# Patient Record
Sex: FEMALE | Race: WHITE
[De-identification: names, ages, dates, MRNs, and addresses within clinical notes are randomized per-mention and may not be internally consistent; named-entity substitution may affect disease eponyms.]

---

## 2018-06-19 ENCOUNTER — HOSPITAL ENCOUNTER (OUTPATIENT)
Dept: HOSPITAL 61 - KCIC MRI | Age: 68
Discharge: HOME | End: 2018-06-19
Attending: PHYSICAL MEDICINE & REHABILITATION
Payer: COMMERCIAL

## 2018-06-19 DIAGNOSIS — M51.27: ICD-10-CM

## 2018-06-19 DIAGNOSIS — M48.061: Primary | ICD-10-CM

## 2018-06-19 PROCEDURE — 72148 MRI LUMBAR SPINE W/O DYE: CPT

## 2020-08-12 ENCOUNTER — HOSPITAL ENCOUNTER (INPATIENT)
Dept: HOSPITAL 61 - ER | Age: 70
LOS: 8 days | Discharge: NURSING, REHAB OR LONG-TERM CARE | DRG: 280 | End: 2020-08-20
Attending: FAMILY MEDICINE | Admitting: FAMILY MEDICINE
Payer: MEDICARE

## 2020-08-12 VITALS — WEIGHT: 203.05 LBS | HEIGHT: 64 IN | BODY MASS INDEX: 34.66 KG/M2

## 2020-08-12 VITALS — SYSTOLIC BLOOD PRESSURE: 145 MMHG | DIASTOLIC BLOOD PRESSURE: 82 MMHG

## 2020-08-12 VITALS — SYSTOLIC BLOOD PRESSURE: 134 MMHG | DIASTOLIC BLOOD PRESSURE: 89 MMHG

## 2020-08-12 VITALS — DIASTOLIC BLOOD PRESSURE: 89 MMHG | SYSTOLIC BLOOD PRESSURE: 114 MMHG

## 2020-08-12 VITALS — SYSTOLIC BLOOD PRESSURE: 166 MMHG | DIASTOLIC BLOOD PRESSURE: 98 MMHG

## 2020-08-12 VITALS — SYSTOLIC BLOOD PRESSURE: 140 MMHG | DIASTOLIC BLOOD PRESSURE: 89 MMHG

## 2020-08-12 VITALS — SYSTOLIC BLOOD PRESSURE: 145 MMHG | DIASTOLIC BLOOD PRESSURE: 108 MMHG

## 2020-08-12 VITALS — DIASTOLIC BLOOD PRESSURE: 77 MMHG | SYSTOLIC BLOOD PRESSURE: 141 MMHG

## 2020-08-12 DIAGNOSIS — G89.29: ICD-10-CM

## 2020-08-12 DIAGNOSIS — D50.9: ICD-10-CM

## 2020-08-12 DIAGNOSIS — I26.92: ICD-10-CM

## 2020-08-12 DIAGNOSIS — I27.20: ICD-10-CM

## 2020-08-12 DIAGNOSIS — E03.9: ICD-10-CM

## 2020-08-12 DIAGNOSIS — J96.01: ICD-10-CM

## 2020-08-12 DIAGNOSIS — M54.16: ICD-10-CM

## 2020-08-12 DIAGNOSIS — F17.210: ICD-10-CM

## 2020-08-12 DIAGNOSIS — K76.0: ICD-10-CM

## 2020-08-12 DIAGNOSIS — Z20.828: ICD-10-CM

## 2020-08-12 DIAGNOSIS — E78.5: ICD-10-CM

## 2020-08-12 DIAGNOSIS — E66.01: ICD-10-CM

## 2020-08-12 DIAGNOSIS — Z90.49: ICD-10-CM

## 2020-08-12 DIAGNOSIS — Z90.710: ICD-10-CM

## 2020-08-12 DIAGNOSIS — Z86.011: ICD-10-CM

## 2020-08-12 DIAGNOSIS — I21.4: Primary | ICD-10-CM

## 2020-08-12 DIAGNOSIS — M19.90: ICD-10-CM

## 2020-08-12 DIAGNOSIS — Z96.653: ICD-10-CM

## 2020-08-12 DIAGNOSIS — I07.1: ICD-10-CM

## 2020-08-12 DIAGNOSIS — I25.10: ICD-10-CM

## 2020-08-12 DIAGNOSIS — Z82.49: ICD-10-CM

## 2020-08-12 DIAGNOSIS — I82.402: ICD-10-CM

## 2020-08-12 DIAGNOSIS — Z79.01: ICD-10-CM

## 2020-08-12 DIAGNOSIS — Z88.2: ICD-10-CM

## 2020-08-12 DIAGNOSIS — J45.909: ICD-10-CM

## 2020-08-12 DIAGNOSIS — I48.91: ICD-10-CM

## 2020-08-12 DIAGNOSIS — I10: ICD-10-CM

## 2020-08-12 LAB
ALBUMIN SERPL-MCNC: 3.1 G/DL (ref 3.4–5)
ALBUMIN/GLOB SERPL: 0.7 {RATIO} (ref 1–1.7)
ALP SERPL-CCNC: 123 U/L (ref 46–116)
ALT SERPL-CCNC: 25 U/L (ref 14–59)
ANION GAP SERPL CALC-SCNC: 11 MMOL/L (ref 6–14)
ANISOCYTOSIS BLD QL SMEAR: (no result)
APTT BLD: 34 SEC (ref 24–38)
AST SERPL-CCNC: 26 U/L (ref 15–37)
BASOPHILS # BLD AUTO: 0.1 X10^3/UL (ref 0–0.2)
BASOPHILS NFR BLD: 1 % (ref 0–3)
BILIRUB SERPL-MCNC: 0.7 MG/DL (ref 0.2–1)
BUN SERPL-MCNC: 24 MG/DL (ref 7–20)
BUN/CREAT SERPL: 24 (ref 6–20)
CALCIUM SERPL-MCNC: 9.2 MG/DL (ref 8.5–10.1)
CHLORIDE SERPL-SCNC: 104 MMOL/L (ref 98–107)
CHOLEST SERPL-MCNC: 130 MG/DL (ref 0–200)
CHOLEST/HDLC SERPL: 2.2 {RATIO}
CO2 SERPL-SCNC: 26 MMOL/L (ref 21–32)
CREAT SERPL-MCNC: 1 MG/DL (ref 0.6–1)
EOSINOPHIL NFR BLD: 0 % (ref 0–3)
EOSINOPHIL NFR BLD: 0 X10^3/UL (ref 0–0.7)
ERYTHROCYTE [DISTWIDTH] IN BLOOD BY AUTOMATED COUNT: 23.3 % (ref 11.5–14.5)
GFR SERPLBLD BASED ON 1.73 SQ M-ARVRAT: 54.8 ML/MIN
GLOBULIN SER-MCNC: 4.3 G/DL (ref 2.2–3.8)
GLUCOSE SERPL-MCNC: 150 MG/DL (ref 70–99)
HCT VFR BLD CALC: 31.8 % (ref 36–47)
HDLC SERPL-MCNC: 60 MG/DL (ref 40–60)
HGB BLD-MCNC: 9.7 G/DL (ref 12–15.5)
HYPOCHROMIA BLD QL SMEAR: (no result)
LDLC: 54 MG/DL (ref 0–100)
LIPASE: 89 U/L (ref 73–393)
LYMPHOCYTES # BLD: 1.1 X10^3/UL (ref 1–4.8)
LYMPHOCYTES NFR BLD AUTO: 10 % (ref 24–48)
MAGNESIUM SERPL-MCNC: 2 MG/DL (ref 1.8–2.4)
MCH RBC QN AUTO: 21 PG (ref 25–35)
MCHC RBC AUTO-ENTMCNC: 31 G/DL (ref 31–37)
MCV RBC AUTO: 68 FL (ref 79–100)
MICROCYTES BLD QL SMEAR: (no result)
MONO #: 0.5 X10^3/UL (ref 0–1.1)
MONOCYTES NFR BLD: 5 % (ref 0–9)
NEUT #: 9.2 X10^3/UL (ref 1.8–7.7)
NEUTROPHILS NFR BLD AUTO: 84 % (ref 31–73)
OVALOCYTES BLD QL SMEAR: (no result)
PLATELET # BLD AUTO: 400 X10^3/UL (ref 140–400)
PLATELET # BLD EST: ADEQUATE 10*3/UL
POLYCHROMASIA BLD QL SMEAR: SLIGHT
POTASSIUM SERPL-SCNC: 4 MMOL/L (ref 3.5–5.1)
PROT SERPL-MCNC: 7.4 G/DL (ref 6.4–8.2)
PROTHROMBIN TIME: 13.4 SEC (ref 11.7–14)
RBC # BLD AUTO: 4.71 X10^6/UL (ref 3.5–5.4)
SODIUM SERPL-SCNC: 141 MMOL/L (ref 136–145)
TOXIC GRANULES BLD QL SMEAR: SLIGHT
TRIGL SERPL-MCNC: 78 MG/DL (ref 0–150)
VLDLC: 16 MG/DL (ref 0–40)
WBC # BLD AUTO: 10.9 X10^3/UL (ref 4–11)

## 2020-08-12 PROCEDURE — 94640 AIRWAY INHALATION TREATMENT: CPT

## 2020-08-12 PROCEDURE — 85730 THROMBOPLASTIN TIME PARTIAL: CPT

## 2020-08-12 PROCEDURE — 84443 ASSAY THYROID STIM HORMONE: CPT

## 2020-08-12 PROCEDURE — 94667 MNPJ CHEST WALL 1ST: CPT

## 2020-08-12 PROCEDURE — G0378 HOSPITAL OBSERVATION PER HR: HCPCS

## 2020-08-12 PROCEDURE — 93970 EXTREMITY STUDY: CPT

## 2020-08-12 PROCEDURE — 99153 MOD SED SAME PHYS/QHP EA: CPT

## 2020-08-12 PROCEDURE — 84100 ASSAY OF PHOSPHORUS: CPT

## 2020-08-12 PROCEDURE — 94660 CPAP INITIATION&MGMT: CPT

## 2020-08-12 PROCEDURE — 93005 ELECTROCARDIOGRAM TRACING: CPT

## 2020-08-12 PROCEDURE — 96376 TX/PRO/DX INJ SAME DRUG ADON: CPT

## 2020-08-12 PROCEDURE — 83735 ASSAY OF MAGNESIUM: CPT

## 2020-08-12 PROCEDURE — 85520 HEPARIN ASSAY: CPT

## 2020-08-12 PROCEDURE — 85025 COMPLETE CBC W/AUTO DIFF WBC: CPT

## 2020-08-12 PROCEDURE — 5A09357 ASSISTANCE WITH RESPIRATORY VENTILATION, LESS THAN 24 CONSECUTIVE HOURS, CONTINUOUS POSITIVE AIRWAY PRESSURE: ICD-10-PCS | Performed by: FAMILY MEDICINE

## 2020-08-12 PROCEDURE — 85027 COMPLETE CBC AUTOMATED: CPT

## 2020-08-12 PROCEDURE — C1769 GUIDE WIRE: HCPCS

## 2020-08-12 PROCEDURE — 99152 MOD SED SAME PHYS/QHP 5/>YRS: CPT

## 2020-08-12 PROCEDURE — 96365 THER/PROPH/DIAG IV INF INIT: CPT

## 2020-08-12 PROCEDURE — 92960 CARDIOVERSION ELECTRIC EXT: CPT

## 2020-08-12 PROCEDURE — 93306 TTE W/DOPPLER COMPLETE: CPT

## 2020-08-12 PROCEDURE — 84484 ASSAY OF TROPONIN QUANT: CPT

## 2020-08-12 PROCEDURE — 94760 N-INVAS EAR/PLS OXIMETRY 1: CPT

## 2020-08-12 PROCEDURE — 83605 ASSAY OF LACTIC ACID: CPT

## 2020-08-12 PROCEDURE — 83690 ASSAY OF LIPASE: CPT

## 2020-08-12 PROCEDURE — C1892 INTRO/SHEATH,FIXED,PEEL-AWAY: HCPCS

## 2020-08-12 PROCEDURE — 87086 URINE CULTURE/COLONY COUNT: CPT

## 2020-08-12 PROCEDURE — 80061 LIPID PANEL: CPT

## 2020-08-12 PROCEDURE — 36415 COLL VENOUS BLD VENIPUNCTURE: CPT

## 2020-08-12 PROCEDURE — 93458 L HRT ARTERY/VENTRICLE ANGIO: CPT

## 2020-08-12 PROCEDURE — 80048 BASIC METABOLIC PNL TOTAL CA: CPT

## 2020-08-12 PROCEDURE — 87040 BLOOD CULTURE FOR BACTERIA: CPT

## 2020-08-12 PROCEDURE — 81001 URINALYSIS AUTO W/SCOPE: CPT

## 2020-08-12 PROCEDURE — 85379 FIBRIN DEGRADATION QUANT: CPT

## 2020-08-12 PROCEDURE — 93312 ECHO TRANSESOPHAGEAL: CPT

## 2020-08-12 PROCEDURE — 71045 X-RAY EXAM CHEST 1 VIEW: CPT

## 2020-08-12 PROCEDURE — 80053 COMPREHEN METABOLIC PANEL: CPT

## 2020-08-12 PROCEDURE — 71275 CT ANGIOGRAPHY CHEST: CPT

## 2020-08-12 PROCEDURE — 96375 TX/PRO/DX INJ NEW DRUG ADDON: CPT

## 2020-08-12 PROCEDURE — 85610 PROTHROMBIN TIME: CPT

## 2020-08-12 RX ADMIN — DILTIAZEM HYDROCHLORIDE PRN MLS/HR: 5 INJECTION INTRAVENOUS at 16:57

## 2020-08-12 RX ADMIN — CYCLOBENZAPRINE HYDROCHLORIDE SCH MG: 10 TABLET, FILM COATED ORAL at 21:00

## 2020-08-12 RX ADMIN — HEPARIN SODIUM PRN MLS/HR: 10000 INJECTION, SOLUTION INTRAVENOUS at 14:31

## 2020-08-12 NOTE — PDOC1
History and Physical


Date of Admission


Date of Admission


DATE: 8/12/20 


TIME: 13:21





Identification/Chief Complaint


Chief Complaint


  SEEN IN ER WITH ACUTE PE , 70  year old female who was brought here from home 

due to chest pain substernal started several hours ago.  Patient called EMS, 

they  gave her 324 mg aspirin and 1 dose of nitroglycerin.  Patient says she has

been sick with a cough for about 12 days.  Patient denies any fever.  Patient 

denies any history of heart problem. 





 not sure if she been exposed to anybody who had  coronavirus.  CTA POS PE





Past Medical History


Cardiovascular:  HTN, Hyperlipidemia





Family History


Family History:  High Cholestrol, Hypertension





Social History


Smoke:  <1 pack per day


ALCOHOL:  none


Drugs:  None





Current Medications


Current Medications





Current Medications


Sodium Chloride 1,000 ml @  1,000 mls/hr 1X  ONCE IV  Last administered on 

8/12/20at 12:09;  Start 8/12/20 at 11:45;  Stop 8/12/20 at 12:44;  Status DC


Ceftriaxone Sodium (Rocephin) 1 gm 1X  ONCE IVP  Last administered on 8/12/20at 

12:07;  Start 8/12/20 at 11:45;  Stop 8/12/20 at 11:47;  Status DC


Iohexol (Omnipaque 350 Mg/ml) 90 ml 1X  ONCE IV ;  Start 8/12/20 at 13:00;  Stop

8/12/20 at 13:01;  Status DC


Metoprolol Tartrate (Lopressor Vial) 5 mg 1X  ONCE IVP  Last administered on 

8/12/20at 13:03;  Start 8/12/20 at 13:15;  Stop 8/12/20 at 13:16;  Status DC





Active Scripts


Active


Reported


Ditropan Xl (Oxybutynin Chloride) 10 Mg Tab.er.24 10 Mg PO DAILY


Zoloft (Sertraline Hcl) 100 Mg Tablet 100 Mg PO DAILY


Ventolin Hfa Inhaler (Albuterol Sulfate) 18 Gm Hfa.aer.ad 2 Puff INH QID


Advair 100-50 Diskus (Fluticasone/Salmeterol) 1 Each Disk.w.dev 1 Inh IH BID


Xanax (Alprazolam) 0.25 Mg Tablet 0.25 Mg PO PRN Q6HRS PRN


Cyclobenzaprine Hcl 10 Mg Tablet 10 Mg PO TID


Levothyroxine Sodium 125 Mcg Tablet 125 Mcg PO DAILYAC


Ranitidine Hcl 300 Mg Capsule 300 Mg PO DAILY


Tramadol Hcl 50 Mg Tablet 50 Mg PO DAILY PRN


Bystolic (Nebivolol) 5 Mg Tablet 5 Mg PO DAILY





Allergies


Allergies:  


Coded Allergies:  


     Sulfa (Sulfonamide Antibiotics) (Verified  Allergy, Intermediate, 9/14/16)





ROS


Review of System


Constitutional:   Denies fever or chills. []


Eyes:   Denies change in visual acuity. []


HENT:   Denies nasal congestion or sore throat. [] 


Respiratory:   Positive for cough and trouble breathing


Cardiovascular:   Positive for chest pain


GI:   Denies abdominal pain, nausea, vomiting, bloody stools or diarrhea. [] 


:  Denies dysuria. [] 


Musculoskeletal:   Denies back pain or joint pain. [] 


Integument:   Denies rash. [] 


Neurologic:   Denies headache, focal weakness or sensory changes. [] 


Endocrine:   Denies polyuria or polydipsia. [] 


Lymphatic:  Denies swollen glands. [] 


Psychiatric:  Denies depression or anxiety. []





14 PT ROS OTHERWISE NEG


Hematological and Lymphatic:  No: Bleeding Problems, Blood Clots, Blood 

Transfusions, Brusing, Night Sweats, Pallor, Swollen Lymph Nodes, Other


Respiratory:  YES: Cough, Shortness of breath


Musculoskeletal:  Yes Gait Disturbance





Physical Exam


Physical Exam





Constitutional: Well developed, well nourished, MILD  acute distress, non-toxic 

appearance. []


HENT: Normocephalic, atraumatic, bilateral external ears normal, oropharynx 

moist, no oral exudates, nose normal. []


Eyes: PERRLA, EOMI, conjunctiva normal, no discharge. [] 


Neck: Normal range of motion, no tenderness, supple, no stridor. [] 


Cardiovascular: RAPID Heart rate Irregular rhythm, no murmur []


Lungs & Thorax:  Bilateral breath sounds with diminshed throughout to 

auscultation []


Abdomen: Bowel sounds normal, soft, no tenderness, no masses, no pulsatile 

masses. [] 


Skin: Warm, dry, no erythema, no rash. [] 


Back: No tenderness, no CVA tenderness. [] 


Extremities: No tenderness, no cyanosis, no clubbing, ROM intact, no edema. [] 


Neurologic: Alert and oriented X 3, normal motor function, normal sensory 

function, no focal deficits noted. []


Psychologic: Affect normal, judgement normal, mood normal. []


General:  Alert, Oriented X3, Cooperative


HEENT:  Atraumatic, EOMI, Mucous membr. moist/pink


Lungs:  Normal air movement


Breasts:  Not examined


Abdomen:  Normal bowel sounds, Soft


Rectal Exam:  not examined


Extremities:  No cyanosis


Neuro:  Normal speech, Cranial nerves 3-12 NL


Psych/Mental Status:  Mental status NL, Mood NL





Vitals


Vitals





Vital Signs








  Date Time  Temp Pulse Resp B/P (MAP) Pulse Ox O2 Delivery O2 Flow Rate FiO2


 


8/12/20 13:03  141  134/89    


 


8/12/20 11:43 97.9  26  85 Nasal Cannula 3.0 





 97.9       











Labs


Labs





Laboratory Tests








Test


 8/12/20


12:05


 


White Blood Count


 10.9 x10^3/uL


(4.0-11.0)


 


Red Blood Count


 4.71 x10^6/uL


(3.50-5.40)


 


Hemoglobin


 9.7 g/dL


(12.0-15.5)


 


Hematocrit


 31.8 %


(36.0-47.0)


 


Mean Corpuscular Volume 68 fL () 


 


Mean Corpuscular Hemoglobin 21 pg (25-35) 


 


Mean Corpuscular Hemoglobin


Concent 31 g/dL


(31-37)


 


Red Cell Distribution Width


 23.3 %


(11.5-14.5)


 


Platelet Count


 400 x10^3/uL


(140-400)


 


Neutrophils (%) (Auto) 84 % (31-73) 


 


Lymphocytes (%) (Auto) 10 % (24-48) 


 


Monocytes (%) (Auto) 5 % (0-9) 


 


Eosinophils (%) (Auto) 0 % (0-3) 


 


Basophils (%) (Auto) 1 % (0-3) 


 


Neutrophils # (Auto)


 9.2 x10^3/uL


(1.8-7.7)


 


Lymphocytes # (Auto)


 1.1 x10^3/uL


(1.0-4.8)


 


Monocytes # (Auto)


 0.5 x10^3/uL


(0.0-1.1)


 


Eosinophils # (Auto)


 0.0 x10^3/uL


(0.0-0.7)


 


Basophils # (Auto)


 0.1 x10^3/uL


(0.0-0.2)


 


Prothrombin Time


 13.4 SEC


(11.7-14.0)


 


Prothromb Time International


Ratio 1.1 (0.8-1.1) 





 


Activated Partial


Thromboplast Time 34 SEC (24-38) 





 


Sodium Level


 141 mmol/L


(136-145)


 


Potassium Level


 4.0 mmol/L


(3.5-5.1)


 


Chloride Level


 104 mmol/L


()


 


Carbon Dioxide Level


 26 mmol/L


(21-32)


 


Anion Gap 11 (6-14) 


 


Blood Urea Nitrogen


 24 mg/dL


(7-20)


 


Creatinine


 1.0 mg/dL


(0.6-1.0)


 


Estimated GFR


(Cockcroft-Gault) 54.8 





 


BUN/Creatinine Ratio 24 (6-20) 


 


Glucose Level


 150 mg/dL


(70-99)


 


Lactic Acid Level


 2.0 mmol/L


(0.4-2.0)


 


Calcium Level


 9.2 mg/dL


(8.5-10.1)


 


Total Bilirubin


 0.7 mg/dL


(0.2-1.0)


 


Aspartate Amino Transf


(AST/SGOT) 26 U/L (15-37) 





 


Alanine Aminotransferase


(ALT/SGPT) 25 U/L (14-59) 





 


Alkaline Phosphatase


 123 U/L


()


 


Troponin I Quantitative


 0.743 ng/mL


(0.000-0.055)


 


Total Protein


 7.4 g/dL


(6.4-8.2)


 


Albumin


 3.1 g/dL


(3.4-5.0)


 


Albumin/Globulin Ratio 0.7 (1.0-1.7) 


 


Lipase


 89 U/L


()








Laboratory Tests








Test


 8/12/20


12:05


 


White Blood Count


 10.9 x10^3/uL


(4.0-11.0)


 


Red Blood Count


 4.71 x10^6/uL


(3.50-5.40)


 


Hemoglobin


 9.7 g/dL


(12.0-15.5)


 


Hematocrit


 31.8 %


(36.0-47.0)


 


Mean Corpuscular Volume 68 fL () 


 


Mean Corpuscular Hemoglobin 21 pg (25-35) 


 


Mean Corpuscular Hemoglobin


Concent 31 g/dL


(31-37)


 


Red Cell Distribution Width


 23.3 %


(11.5-14.5)


 


Platelet Count


 400 x10^3/uL


(140-400)


 


Neutrophils (%) (Auto) 84 % (31-73) 


 


Lymphocytes (%) (Auto) 10 % (24-48) 


 


Monocytes (%) (Auto) 5 % (0-9) 


 


Eosinophils (%) (Auto) 0 % (0-3) 


 


Basophils (%) (Auto) 1 % (0-3) 


 


Neutrophils # (Auto)


 9.2 x10^3/uL


(1.8-7.7)


 


Lymphocytes # (Auto)


 1.1 x10^3/uL


(1.0-4.8)


 


Monocytes # (Auto)


 0.5 x10^3/uL


(0.0-1.1)


 


Eosinophils # (Auto)


 0.0 x10^3/uL


(0.0-0.7)


 


Basophils # (Auto)


 0.1 x10^3/uL


(0.0-0.2)


 


Prothrombin Time


 13.4 SEC


(11.7-14.0)


 


Prothromb Time International


Ratio 1.1 (0.8-1.1) 





 


Activated Partial


Thromboplast Time 34 SEC (24-38) 





 


Sodium Level


 141 mmol/L


(136-145)


 


Potassium Level


 4.0 mmol/L


(3.5-5.1)


 


Chloride Level


 104 mmol/L


()


 


Carbon Dioxide Level


 26 mmol/L


(21-32)


 


Anion Gap 11 (6-14) 


 


Blood Urea Nitrogen


 24 mg/dL


(7-20)


 


Creatinine


 1.0 mg/dL


(0.6-1.0)


 


Estimated GFR


(Cockcroft-Gault) 54.8 





 


BUN/Creatinine Ratio 24 (6-20) 


 


Glucose Level


 150 mg/dL


(70-99)


 


Lactic Acid Level


 2.0 mmol/L


(0.4-2.0)


 


Calcium Level


 9.2 mg/dL


(8.5-10.1)


 


Total Bilirubin


 0.7 mg/dL


(0.2-1.0)


 


Aspartate Amino Transf


(AST/SGOT) 26 U/L (15-37) 





 


Alanine Aminotransferase


(ALT/SGPT) 25 U/L (14-59) 





 


Alkaline Phosphatase


 123 U/L


()


 


Troponin I Quantitative


 0.743 ng/mL


(0.000-0.055)


 


Total Protein


 7.4 g/dL


(6.4-8.2)


 


Albumin


 3.1 g/dL


(3.4-5.0)


 


Albumin/Globulin Ratio 0.7 (1.0-1.7) 


 


Lipase


 89 U/L


()











Images


Images


 


Study: CT CHEST WITH CONTRAST - PULMONARY ANGIOGRAM


 


History: Chest pain. Shortness of air. Pulmonary embolism.


 


Comparison: None.


 


Technique:  Helical CT of the chest performed after the administration of 


90 cc Omnipaque 350 intravenous contrast and timed for angiographic 


evaluation of the pulmonary arteries per PE protocol. Coronal and sagittal


3D MIP reformations were obtained.


 


One or more of the following individualized dose reduction techniques were


utilized for this examination:  


 


1. Automated exposure control


2. Adjustment of the mA and/or kV according to patient size


3. Use of iterative reconstruction technique.


 


Findings: 


 


Pulmonary Arteries: Extensive pulmonary embolic burden to include a saddle


embolism measuring up to 0.8 cm in thickness. Emboli are present within 


both main pulmonary arteries and involve lobar and segmental branches to 


all lung lobes. The main pulmonary artery is prominent in transverse 


dimension at around 3.7 cm and there is right heart strain with 


interventricular septal deviation, enlargement of the right heart chambers


and reflux of injected contrast far into the hepatic veins.


 


Mediastinum: Moderate sized hiatal hernia.


 


Lungs: Mosaic attenuation pattern of the lungs likely relating to 


oligemia. Ill-defined infiltrate at the subpleural right lower lobe but 


there is no wedge-shaped consolidation to suggest an infarct at this time.


 


Neck/Axilla/Body Wall: No axillary adenopathy. What is seen of the breast 


tissue is relatively symmetric.


 


Upper Abdomen: Hepatic steatosis. Surgically absent gallbladder. Fatty 


infiltration of the pancreas. Small exophytic focus off the upper pole of 


the right kidney, image 148 series 4, measures around 10 Hounsfield units 


most compatible with a cyst. Similar finding at the anterior aspect of the


upper right kidney. No adrenal gland mass.


 


Bones: Partially imaged advanced arthrosis at the right shoulder. 


Multifocal degenerative changes throughout the visualized spine on a 


background of sigmoid curvature. Varying degrees of osseous neural 


foraminal encroachment. 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 


eighth rib.


 


Miscellaneous: None.


 


IMPRESSION: 


 


1.  Extensive acute pulmonary emboli to include a saddle embolism, emboli 


within both main pulmonary arteries and involvement of the lobar and 


segmental branches to all lung lobes. This results in pronounced right 


heart strain. No well-formed pulmonary infarct at this time.


2.  Moderate-sized hiatal hernia, hepatic steatosis and additional chronic


findings as above.


 


 


**********FOR INTERNAL CODING PURPOSES**********


 


 


RESULT CODE: (C)  


 


The results of the study were discussed with Dr. Wang by Dr. Moreira via 


telephone on 8/12/2020 at 1340 hours.  


 


 


 


Electronically signed by: FREYA MOREIRA MD (8/12/2020 1:56 PM) [id number]














DICTATED and SIGNED BY:     FREYA MOREIRA MD


DATE:     08/12/20 1356





EXAM: CHEST 1 VIEW 


 


History: Shortness of breath, chest pain 


 


COMPARISON: None available.


 


TECHNIQUE: Single portable radiograph of the chest


 


FINDINGS:  The cardiac silhouette is unremarkable. Mild prominent 


appearing right hilum. The costophrenic sulci are clear and well 


demarcated.


 


IMPRESSION:  Mild prominent right hilum probably vascular prominence. 


Consider CT for further evaluation.


 


 


Electronically signed by: Immanuel Miramontes MD (8/12/2020 11:53 AM) [id number]














DICTATED and SIGNED BY:     IMMANUEL MIRAMONTES MD


DATE:     08/12/20 1153





VTE Prophylaxis Ordered


VTE Prophylaxis Devices:  No


VTE Pharmacological Prophylaxi:  Yes





Assessment/Plan


Assessment/Plan


IMPRESSION 





1. Chest pain: due to PE, AFIB and RV ischemia


Extensive acute pulmonary emboli to include a saddle embolism, emboli  within 

both main pulmonary arteries and involvement of the lobar and  segmental 

branches to all lung lobes. // pronounced right  heart strain. No well-formed 

pulmonary infarct at this time.


2. Moderate-sized hiatal hernia, hepatic steatosis  


3. Saddle PE ACUTE 


4. AFIB RVR: 


5. NSTEMI: likely associated RV ischemia with PE


6. Lumbar radiculopathy // recent nontraumatic fall


7. Hypothyroidism: TSH 5 


8. Microcytic anemia: Fe 


9. MORBID OBESITY











PLAN





1.  heparin per PE protocol. 


Consult pulmonary. 


2. R/O covid. -19 


3,LE venous doppler and TTE once confirmed negative


4. Metoprolol IV. If not  wheezing then will start on metoprolol PO. 


5. Lipids.


6. CONSULT CARDIOLOGY


7. CONSULT PULM  








77 MIN pt exam, chart review, > 50% of time spent with exam, chart review, pt 

care coordination





Justicifation of Admission Dx:


Justifications for Admission:


Justification of Admission Dx:  Yes


Comments:


PEPE Crabtree MD          Aug 12, 2020 13:21

## 2020-08-12 NOTE — PDOC2
MAGNUS MANZANARES APRN 20 1313:


CARDIAC CONSULT


DATE OF CONSULT


Date of Consult


DATE: 20 


TIME: 13:09





REASON FOR CONSULT


Reason for Consult:


chest pain, AFIB RVR





REFERRING PHYSICIAN


Referring Physician:


Felipe





SOURCE


Source:  Chart review, Patient





HISTORY OF PRESENT ILLNESS


HISTORY OF PRESENT ILLNESS


This is a pleasant 71 yo female admitted for complains of chest pain and 

shortness of breath. Reports that 2 weeks ago she twisted her knee and fell. No 

apparent injury. She also has bad lower back and was told that she needs lumbar 

fusion. Poisitive for radiculopathy and seen by Dr. Connell before. Her 

mobility has been decreased significantly in the last 2 weeks due to back and 

leg pain. No swelling on her legs. 2 days ago she started noticing HIGGINS. This 

progressively got worse and actually woke up with it. She called her PCP 

reporting that her inhaler has not been working for her asthma. She has been 

having dry cough and wheezing. Also started having mid chest pressure but no 

nausea or vomiting. No sensation of palpitations but positive for dizziness. No 

fever or chills and no recent exposure to covid and she lives alone. She does 

not have any hx VTE, CAD. She was told of some narrowing of her valves in the 

past and may be some irregular heart rhythm but does not rrmebr of AFIB and has 

not been on any anticoagulation nor BP meds. No hx of bleeding or PUD. No HRT.





PAST MEDICAL HISTORY


Cardiovascular:  Valve insufficiency (?)


Pulmonary:  Asthma


CENTRAL NERVOUS SYSTEM:  Other (lumbar radiculopathy; brain tumor 5 yrs ago, 

benign)


GI:  No pertinent hx


Heme/Onc:  No pertinent hx


Hepatobiliary:  No pertinent hx


Psych:  No pertinent hx


Musculoskeletal:   low back pain, Osteoarthritis


Infectious disease:  No pertinent hx


ENT:  No pertinent hx


Renal/:  No pertinent hx


Endocrine:  Hypothyroidism


Dermatology:  No pertinent hx





PAST SURGICAL HISTORY


Past Surgical History:  Cholecystectomy, , Total knee replacement 

(bilateral remote), Hysterectomy, Other (craniotomy 5 yrs ago; ovarian cyst 

removal)





FAMILY HISTORY


Family History


noncontributory to VTE nor CV





SOCIAL HISTORY


Smoke:  No


ALCOHOL:  none


Drugs:  None


Lives:  Alone





CURRENT MEDICATIONS


CURRENT MEDICATIONS





Current Medications








 Medications


  (Trade)  Dose


 Ordered  Sig/Mary


 Route


 PRN Reason  Start Time


 Stop Time Status Last Admin


Dose Admin


 


 Sodium Chloride  1,000 ml @ 


 1,000 mls/hr  1X  ONCE


 IV


   20 11:45


 20 12:44 DC 20 12:09





 


 Ceftriaxone Sodium


  (Rocephin)  1 gm  1X  ONCE


 IVP


   20 11:45


 20 11:47 DC 20 12:07





 


 Metoprolol


 Tartrate


  (Lopressor Vial)  5 mg  1X  ONCE


 IVP


   20 13:15


 20 13:16  20 13:03














ALLERGIES


ALLERGIES:  


Coded Allergies:  


     Sulfa (Sulfonamide Antibiotics) (Verified  Allergy, Intermediate, 16)





ROS


Review of System


14 point ROS evaluated with pertinent positives noted per HPI





PHYSICAL EXAM


General:  Alert, Oriented X3, Cooperative, No acute distress


HEENT:  Atraumatic, Mucous membr. moist/pink


Lungs:  Other (CTA reviewed + PE)


Heart:  Other (AFIB RVR)


Abdomen:  Soft, No tenderness


Extremities:  No cyanosis, No edema


Skin:  No breakdown, No significant lesion


Neuro:  Normal speech, Sensation intact


Psych/Mental Status:  Mental status NL, Mood NL


MUSCULOSKELETAL:  Osteoarthritic changes both hands





VITALS/I&O


VITALS/I&O:





                                   Vital Signs








  Date Time  Temp Pulse Resp B/P (MAP) Pulse Ox O2 Delivery O2 Flow Rate FiO2


 


20 13:03  141  134/89    


 


20 11:43 97.9  26  85 Nasal Cannula 3.0 





 97.9       











LABS


Lab:





                                Laboratory Tests








Test


 20


12:05


 


White Blood Count


 10.9 x10^3/uL


(4.0-11.0)


 


Red Blood Count


 4.71 x10^6/uL


(3.50-5.40)


 


Hemoglobin


 9.7 g/dL


(12.0-15.5)  L


 


Hematocrit


 31.8 %


(36.0-47.0)  L


 


Mean Corpuscular Volume


 68 fL ()


L


 


Mean Corpuscular Hemoglobin


 21 pg (25-35)


L


 


Mean Corpuscular Hemoglobin


Concent 31 g/dL


(31-37)


 


Red Cell Distribution Width


 23.3 %


(11.5-14.5)  H


 


Platelet Count


 400 x10^3/uL


(140-400)


 


Neutrophils (%) (Auto) 84 % (31-73)  H


 


Lymphocytes (%) (Auto) 10 % (24-48)  L


 


Monocytes (%) (Auto) 5 % (0-9)  


 


Eosinophils (%) (Auto) 0 % (0-3)  


 


Basophils (%) (Auto) 1 % (0-3)  


 


Neutrophils # (Auto)


 9.2 x10^3/uL


(1.8-7.7)  H


 


Lymphocytes # (Auto)


 1.1 x10^3/uL


(1.0-4.8)


 


Monocytes # (Auto)


 0.5 x10^3/uL


(0.0-1.1)


 


Eosinophils # (Auto)


 0.0 x10^3/uL


(0.0-0.7)


 


Basophils # (Auto)


 0.1 x10^3/uL


(0.0-0.2)


 


Platelet Estimate Pending  


 


Prothrombin Time


 13.4 SEC


(11.7-14.0)


 


Prothrombin Time INR 1.1 (0.8-1.1)  


 


Activated Partial


Thromboplast Time 34 SEC (24-38)





 


Sodium Level


 141 mmol/L


(136-145)


 


Potassium Level


 4.0 mmol/L


(3.5-5.1)


 


Chloride Level


 104 mmol/L


()


 


Carbon Dioxide Level


 26 mmol/L


(21-32)


 


Anion Gap 11 (6-14)  


 


Blood Urea Nitrogen


 24 mg/dL


(7-20)  H


 


Creatinine


 1.0 mg/dL


(0.6-1.0)


 


Estimated GFR


(Cockcroft-Gault) 54.8  





 


BUN/Creatinine Ratio 24 (6-20)  H


 


Glucose Level


 150 mg/dL


(70-99)  H


 


Lactic Acid Level


 2.0 mmol/L


(0.4-2.0)


 


Calcium Level


 9.2 mg/dL


(8.5-10.1)


 


Total Bilirubin


 0.7 mg/dL


(0.2-1.0)


 


Aspartate Amino Transferase


(AST) 26 U/L (15-37)





 


Alanine Aminotransferase (ALT)


 25 U/L (14-59)





 


Alkaline Phosphatase


 123 U/L


()  H


 


Troponin I Quantitative


 0.743 ng/mL


(0.000-0.055)


 


Total Protein


 7.4 g/dL


(6.4-8.2)


 


Albumin


 3.1 g/dL


(3.4-5.0)  L


 


Albumin/Globulin Ratio


 0.7 (1.0-1.7)


L


 


Lipase


 89 U/L


()





                                Laboratory Tests


20 12:05








                                Laboratory Tests


20 12:05











ASSESSMENT/PLAN


ASSESSMENT/PLAN


1. Chest pain: due to PE, AFIB and RV ischemia


2. Saddle PE


3. AFIB RVR: induced by PE


4. NSTEMI: likely associated RV ischemia with PE


5. Lumbar radiculopathy with significant decreased mobility with recent 

nontraumatic fall


6. Hypothyroidism: TSH 5 not on goal. on home replacement defer to PCP


7. Microcytic anemia: Fe def. Hgb at 9.7





Recommendations


1. ASA. Start on heparin per PE protocol. Consult pulmonary. Repeat EKG once HR 

is slower. 


2. Presently being tested for covid. Will Obtain LE venous doppler and TTE once 

confirmed negative


3. Poor response to metoprolol. Dig IV x1 and if remains in RVR then will start 

on cardizem drip


4. Lipids.





MESSI LARKIN :


CARDIAC CONSULT


ASSESSMENT/PLAN


ASSESSMENT/PLAN


Agree with NP's assessment and plan.


Start cardizem gtt for AF rate control


Trop elevation prob demand ischemia


Continue heparin for stroke proph and change to eliquis prior to DC


Agree with 2D echo if covid negative


Thank you for your consultation











MAGNUS MANZANARES          Aug 12, 2020 13:13


MESSI LARKIN MD           Aug 12, 2020 21:19

## 2020-08-12 NOTE — CONS
DATE OF CONSULTATION:  



PULMONARY CONSULTATION



ATTENDING PHYSICIAN:  Dr. Noel.



REASON FOR CONSULTATION:  Acute pulmonary embolism.



HISTORY OF PRESENT ILLNESS:  The patient is a 70-year-old pleasant female with a

BMI of 32.8.  No significant tobacco history.  She presented to the Emergency

Room with complaint of chest pain and shortness of breath.  She states that 2

weeks ago, she twisted her knee and fell.  There was no obvious injury.  She

also has been battling with bad lower back pain and she was told by Dr. Connell

that she would need lumbar fusion.  Her mobility has been decreased in the last

few weeks.  The patient began to have shortness of breath and chest pain.  She

also said that she had at least twice near syncopal episodes.  She denied any

leg swelling.  No headaches, no nausea, vomiting, no diarrhea, no dysuria.  She

has no prior history of thromboembolic disease.  No family history of

thromboembolic disease.  She was noted to be in atrial fibrillation with RVR, as

a part of workup CTA chest was performed and it showed extensive acute pulmonary

emboli including a saddle embolism within both main pulmonary arteries and

involvement of the lobar and segmental branches to all the lobes.  There was

evidence of some right heart strain.  There was moderate size hiatal hernia and

hepatic steatosis.  I saw the patient in the Emergency Room.  She appeared to be

comfortable while on nasal cannula at 5 liters with saturation of 93%.  Her

blood pressure was stable, ranging 127 systolic to 138 systolic.  Consultation

requested for further evaluation and management.



PAST MEDICAL HISTORY:  History of lumbar radiculopathy.  History of benign brain

tumor 5 years ago.  No recent surgeries in the last 6 weeks.  No history of any

malignancy.  No history of any bleeding ulcer.  History of low back pain,

history of osteoarthritis, and hypothyroidism.



PAST SURGICAL HISTORY:  Cholecystectomy, , total knee replacement,

bilateral.  History of hysterectomy and craniotomy 5 years ago and history of

ovarian cyst removal.



FAMILY HISTORY:  Noncontributory to any thromboembolic disease.  There is no

family history of thromboembolic disease.



SOCIAL HISTORY:  Nonsmoker, nonalcoholic.



ALLERGIES:  SULFA.



MEDICATIONS:  That were given in the ER were reviewed including heparin per

protocol.



REVIEW OF SYSTEMS:  Twelve-point system obtained.  Pertinent positives discussed

in my history of present illness, otherwise noncontributory.  All systems that

were negative were reviewed as well.



PHYSICAL EXAMINATION:

GENERAL:  She is comfortable while on nasal cannula at 5 liters 93% saturation.

VITAL SIGNS:  Blood pressure is stable.

HEENT:  Sclerae nonicteric.

NECK:  Supple.

LUNGS:  With diminished breath sounds.

CARDIOVASCULAR:  With a regular rate.

ABDOMEN:  Soft, obese.

EXTREMITIES:  With no pitting edema.



LABORATORY DATA:  Reviewed.  Her D-dimer was 6.57.  BUN 24, creatinine 1.0.  TSH

5.05.  White cell count 10.9, hemoglobin 9.7, platelets are 400.



IMPRESSION:

1.  Acute hypoxic respiratory failure secondary to acute pulmonary embolism with

hemodynamic stability .  Risk factor is likely related to recent immobility for

the last 2 weeks due to her lumbar radiculopathy and also from the fall when she

twisted her knee.  Underlying obesity would be another risk factor.  She has no

known cancers.  Not on any form of estrogens.

2.  Atrial fibrillation with rapid ventricular response, likely caused by right

ventricular strain.

3.  No significant tobacco history.

4.  No history of any hypercoagulable state.



RECOMMENDATIONS:

1.  Discussed with the patient and RN.  At this time, she is hemodynamically

stable and we will continue with heparin per protocol.

2.  I did discuss with her that in case if she becomes hemodynamically unstable,

she may be a candidate for thrombolytic therapy.  She does not have any risk

factors for bleeding and she is agreeable with that  if needed.

3.  Management of AFib with RVR per Cardiology.

4.  Obtain echocardiogram to assess for RV dysfunction.

5.  Follow troponin level.  Her initial level was 0.74 suggestive of RV

ischemia.

6.  Obtain venous Dopplers of lower extremities.

7.  We will consider obtaining hypercoagulable panel as an outpatient.

8.  Discussed with RN.  Discussed with ER physician.  We will follow the patient

in the ICU under close observation.



Critical care time 37 minutes including review of the chart, imaging studies and

discussion with the patient regarding the plan of care.

 



______________________________

UMU FRAZIER MD DR:  COURTNEY/bulmaro  JOB#:  [id number] / [id number]

DD:  2020 16:00  DT:  2020 16:21

ANIKET

## 2020-08-12 NOTE — RAD
Study: CT CHEST WITH CONTRAST - PULMONARY ANGIOGRAM

 

History: Chest pain. Shortness of air. Pulmonary embolism.

 

Comparison: None.

 

Technique:  Helical CT of the chest performed after the administration of 

90 cc Omnipaque 350 intravenous contrast and timed for angiographic 

evaluation of the pulmonary arteries per PE protocol. Coronal and sagittal

3D MIP reformations were obtained.

 

One or more of the following individualized dose reduction techniques were

utilized for this examination:  

 

1. Automated exposure control

2. Adjustment of the mA and/or kV according to patient size

3. Use of iterative reconstruction technique.

 

Findings: 

 

Pulmonary Arteries: Extensive pulmonary embolic burden to include a saddle

embolism measuring up to 0.8 cm in thickness. Emboli are present within 

both main pulmonary arteries and involve lobar and segmental branches to 

all lung lobes. The main pulmonary artery is prominent in transverse 

dimension at around 3.7 cm and there is right heart strain with 

interventricular septal deviation, enlargement of the right heart chambers

and reflux of injected contrast far into the hepatic veins.

 

Mediastinum: Moderate sized hiatal hernia.

 

Lungs: Mosaic attenuation pattern of the lungs likely relating to 

oligemia. Ill-defined infiltrate at the subpleural right lower lobe but 

there is no wedge-shaped consolidation to suggest an infarct at this time.

 

Neck/Axilla/Body Wall: No axillary adenopathy. What is seen of the breast 

tissue is relatively symmetric.

 

Upper Abdomen: Hepatic steatosis. Surgically absent gallbladder. Fatty 

infiltration of the pancreas. Small exophytic focus off the upper pole of 

the right kidney, image 148 series 4, measures around 10 Hounsfield units 

most compatible with a cyst. Similar finding at the anterior aspect of the

upper right kidney. No adrenal gland mass.

 

Bones: Partially imaged advanced arthrosis at the right shoulder. 

Multifocal degenerative changes throughout the visualized spine on a 

background of sigmoid curvature. Varying degrees of osseous neural 

foraminal encroachment. 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 

eighth rib.

 

Miscellaneous: None.

 

IMPRESSION: 

 

1.  Extensive acute pulmonary emboli to include a saddle embolism, emboli 

within both main pulmonary arteries and involvement of the lobar and 

segmental branches to all lung lobes. This results in pronounced right 

heart strain. No well-formed pulmonary infarct at this time.

2.  Moderate-sized hiatal hernia, hepatic steatosis and additional chronic

findings as above.

 

 

**********FOR INTERNAL CODING PURPOSES**********

 

 

RESULT CODE: (C)  

 

The results of the study were discussed with Dr. Wang by Dr. Moreira via 

telephone on 8/12/2020 at 1340 hours.  

 

 

 

Electronically signed by: FREYA MOREIRA MD (8/12/2020 1:56 PM) [id number]

## 2020-08-12 NOTE — RAD
EXAM: CHEST 1 VIEW 

 

History: Shortness of breath, chest pain 

 

COMPARISON: None available.

 

TECHNIQUE: Single portable radiograph of the chest

 

FINDINGS:  The cardiac silhouette is unremarkable. Mild prominent 

appearing right hilum. The costophrenic sulci are clear and well 

demarcated.

 

IMPRESSION:  Mild prominent right hilum probably vascular prominence. 

Consider CT for further evaluation.

 

 

Electronically signed by: Immanuel Miramontes MD (8/12/2020 11:53 AM) [id number]

## 2020-08-12 NOTE — PHYS DOC
Past Medical History


Past Medical History:  Asthma


Additional Past Medical Histor:  CHRONIC BACK PAIN


Past Surgical History:  Cholecystectomy, Knee Replacement


Smoking Status:  Never Smoker


Alcohol Use:  None





General Adult


EDM:


Chief Complaint:  CHEST PAIN





HPI:


HPI:





Patient is a 70  year old female who was brought here from home due to chest 

pain substernal started several hours ago.  Patient called EMS, they  gave her 

324 mg aspirin and 1 dose of nitroglycerin.  Patient says she has been sick with

a cough for about 12 days.  Patient denies any fever.  Patient denies any 

history of heart problem.  Patient is not sure if she been exposed to anybody 

who had the coronavirus.  Patient had no history of blood clot disorder.  

Patient denied recent travel or operation.





Review of Systems:


Review of Systems:


Constitutional:   Denies fever or chills. []


Eyes:   Denies change in visual acuity. []


HENT:   Denies nasal congestion or sore throat. [] 


Respiratory:   Positive for cough and trouble breathing


Cardiovascular:   Positive for chest pain


GI:   Denies abdominal pain, nausea, vomiting, bloody stools or diarrhea. [] 


:  Denies dysuria. [] 


Musculoskeletal:   Denies back pain or joint pain. [] 


Integument:   Denies rash. [] 


Neurologic:   Denies headache, focal weakness or sensory changes. [] 


Endocrine:   Denies polyuria or polydipsia. [] 


Lymphatic:  Denies swollen glands. [] 


Psychiatric:  Denies depression or anxiety. []





Heart Score:


HEART Score for Chest Pain:  








HEART Score for Chest Pain Response (Comments) Value


 


History Moderately Suspicious 1


 


ECG Nonspecific Repolarizatio 1


 


Age > 65 2


 


Risk Factors                            1 or 2 Risk Factors 1


 


Troponin >1-<3x Normal Limit 1


 


Total  6








Risk Factors:


Risk Factors:  DM, Current or recent (<one month) smoker, HTN, HLP, family 

history of CAD, obesity.


Risk Scores:


Score 0 - 3:  2.5% MACE over next 6 weeks - Discharge Home


Score 4 - 6:  20.3% MACE over next 6 weeks - Admit for Clinical Observation


Score 7 - 10:  72.7% MACE over next 6 weeks - Early Invasive Strategies





Current Medications:





Current Medications








 Medications


  (Trade)  Dose


 Ordered  Sig/Mary  Start Time


 Stop Time Status Last Admin


Dose Admin


 


 Ceftriaxone Sodium


  (Rocephin)  1 gm  1X  ONCE  20 11:45


 20 11:47 DC 20 12:07


1 GM


 


 Heparin Sodium


  (Porcine)


  (Heparin Sodium)  2,150 unit  PRN Q6HRS  PRN  20 13:30


    20 13:45


2,150 UNIT


 


 Heparin Sodium/


 Dextrose  250 ml @ 0


 mls/hr  CONT  PRN  20 13:30


    20 13:47


10.4 MLS/HR


 


 Iohexol


  (Omnipaque 350


 Mg/ml)  90 ml  1X  ONCE  20 13:00


 20 13:01 DC  





 


 Metoprolol


 Tartrate


  (Lopressor Vial)  5 mg  1X  ONCE  20 13:15


 20 13:16 DC 20 13:03


5 MG


 


 Ondansetron HCl


  (Zofran)  4 mg  PRN Q8HRS  PRN  20 13:45


 20 13:44   





 


 Sodium Chloride  1,000 ml @ 


 1,000 mls/hr  1X  ONCE  20 11:45


 20 12:44 DC 20 12:09


1,000 MLS/HR











Allergies:


Allergies:





Allergies








Coded Allergies Type Severity Reaction Last Updated Verified


 


  Sulfa (Sulfonamide Antibiotics) Allergy Intermediate  16 Yes











Physical Exam:


PE:





Constitutional: Well developed, well nourished, MILD  acute distress, non-toxic 

appearance. []


HENT: Normocephalic, atraumatic, bilateral external ears normal, oropharynx 

moist, no oral exudates, nose normal. []


Eyes: PERRLA, EOMI, conjunctiva normal, no discharge. [] 


Neck: Normal range of motion, no tenderness, supple, no stridor. [] 


Cardiovascular: RAPID Heart rate Irregular rhythm, no murmur []


Lungs & Thorax:  Bilateral breath sounds with diminshed throughout to 

auscultation []


Abdomen: Bowel sounds normal, soft, no tenderness, no masses, no pulsatile 

masses. [] 


Skin: Warm, dry, no erythema, no rash. [] 


Back: No tenderness, no CVA tenderness. [] 


Extremities: No tenderness, no cyanosis, no clubbing, ROM intact, no edema. [] 


Neurologic: Alert and oriented X 3, normal motor function, normal sensory 

function, no focal deficits noted. []


Psychologic: Affect normal, judgement normal, mood normal. []





Current Patient Data:


Labs:





                                Laboratory Tests








Test


 20


12:05


 


White Blood Count


 10.9 x10^3/uL


(4.0-11.0)


 


Red Blood Count


 4.71 x10^6/uL


(3.50-5.40)


 


Hemoglobin


 9.7 g/dL


(12.0-15.5)  L


 


Hematocrit


 31.8 %


(36.0-47.0)  L


 


Mean Corpuscular Volume


 68 fL ()


L


 


Mean Corpuscular Hemoglobin


 21 pg (25-35)


L


 


Mean Corpuscular Hemoglobin


Concent 31 g/dL


(31-37)


 


Red Cell Distribution Width


 23.3 %


(11.5-14.5)  H


 


Platelet Count


 400 x10^3/uL


(140-400)


 


Neutrophils (%) (Auto) 84 % (31-73)  H


 


Lymphocytes (%) (Auto) 10 % (24-48)  L


 


Monocytes (%) (Auto) 5 % (0-9)  


 


Eosinophils (%) (Auto) 0 % (0-3)  


 


Basophils (%) (Auto) 1 % (0-3)  


 


Neutrophils # (Auto)


 9.2 x10^3/uL


(1.8-7.7)  H


 


Lymphocytes # (Auto)


 1.1 x10^3/uL


(1.0-4.8)


 


Monocytes # (Auto)


 0.5 x10^3/uL


(0.0-1.1)


 


Eosinophils # (Auto)


 0.0 x10^3/uL


(0.0-0.7)


 


Basophils # (Auto)


 0.1 x10^3/uL


(0.0-0.2)


 


Platelet Estimate Pending  


 


Prothrombin Time


 13.4 SEC


(11.7-14.0)


 


Prothrombin Time INR 1.1 (0.8-1.1)  


 


Activated Partial


Thromboplast Time 34 SEC (24-38)





 


D-Dimer (Elizabeth)


 6.57 ug/mlFEU


(0.00-0.50)  H


 


Sodium Level


 141 mmol/L


(136-145)


 


Potassium Level


 4.0 mmol/L


(3.5-5.1)


 


Chloride Level


 104 mmol/L


()


 


Carbon Dioxide Level


 26 mmol/L


(21-32)


 


Anion Gap 11 (6-14)  


 


Blood Urea Nitrogen


 24 mg/dL


(7-20)  H


 


Creatinine


 1.0 mg/dL


(0.6-1.0)


 


Estimated GFR


(Cockcroft-Gault) 54.8  





 


BUN/Creatinine Ratio 24 (6-20)  H


 


Glucose Level


 150 mg/dL


(70-99)  H


 


Lactic Acid Level


 2.0 mmol/L


(0.4-2.0)


 


Calcium Level


 9.2 mg/dL


(8.5-10.1)


 


Magnesium Level


 2.0 mg/dL


(1.8-2.4)


 


Total Bilirubin


 0.7 mg/dL


(0.2-1.0)


 


Aspartate Amino Transferase


(AST) 26 U/L (15-37)





 


Alanine Aminotransferase (ALT)


 25 U/L (14-59)





 


Alkaline Phosphatase


 123 U/L


()  H


 


Troponin I Quantitative


 0.743 ng/mL


(0.000-0.055)


 


Total Protein


 7.4 g/dL


(6.4-8.2)


 


Albumin


 3.1 g/dL


(3.4-5.0)  L


 


Albumin/Globulin Ratio


 0.7 (1.0-1.7)


L


 


Triglycerides Level


 78 mg/dL


(0-150)


 


Cholesterol Level


 130 mg/dL


(0-200)


 


LDL Cholesterol, Calculated


 54 mg/dL


(0-100)


 


VLDL Cholesterol, Calculated


 16 mg/dL


(0-40)


 


Non-HDL Cholesterol Calculated


 70 mg/dL


(0-129)


 


HDL Cholesterol


 60 mg/dL


(40-60)


 


Cholesterol/HDL Ratio 2.2  


 


Lipase


 89 U/L


()


 


Thyroid Stimulating Hormone


(TSH) 5.058 uIU/mL


(0.358-3.74)  H





                                Laboratory Tests


20 12:05








                                Laboratory Tests


20 12:05








Vital Signs:





                                   Vital Signs








  Date Time  Temp Pulse Resp B/P (MAP) Pulse Ox O2 Delivery O2 Flow Rate FiO2


 


20 13:03  141  134/89    


 


20 11:43 97.9  26  85 Nasal Cannula 3.0 





 97.9       











EKG:


EKG:


EKG was done at 1129, heart rate of 143 beats per minute, A. fib with RVR, no ST

 segment elevation.





Radiology/Procedures:


Radiology/Procedures:


[][facility]


                    [address] [place] [address]


                                 [phone]


                                        


                                 IMAGING REPORT





                                     Signed





PATIENT: LYLE JOHN      ACCOUNT: [id number]     MRN#: [id number]


: 1950           LOCATION: ER              AGE: 70


SEX: F                    EXAM DT: 20         ACCESSION#: [id number]


STATUS: PRE ER            ORD. PHYSICIAN: SPRING GREWAL DO


REASON: soa, chest pain


PROCEDURE: CHEST AP ONLY





 


EXAM: CHEST 1 VIEW 


 


History: Shortness of breath, chest pain 


 


COMPARISON: None available.


 


TECHNIQUE: Single portable radiograph of the chest


 


FINDINGS:  The cardiac silhouette is unremarkable. Mild prominent 


appearing right hilum. The costophrenic sulci are clear and well 


demarcated.


 


IMPRESSION:  Mild prominent right hilum probably vascular prominence. 


Consider CT for further evaluation.


 


 


Electronically signed by: Immanuel Miramontes MD (2020 11:53 AM) [id number]














DICTATED and SIGNED BY:     IMMANUEL MIRAMONTES MD


DATE:     20 115





Course & Med Decision Making:


Course & Med Decision Making


Pertinent Labs and Imaging studies reviewed. (See chart for details)





Patient is a 70-year-old female who was brought here for evaluation due to chest

 pain that started several hours ago.  Patient also has been sick with cough for

 about 12 days.  Patient is suspected of COVID-19 infection.  Upon arrival to 

ER, patient's heart rate was between 130 to 150 bpm, atrial fibrillation with 

RVR.  Patient was found to have a saddle embolus, her blood pressure however has

 been good.  Discussed with interventional radiologist Dr. Saturnino Patel who 

recommended heparin treatment, no IV TPA due to normal blood pressure. 





Discussed with Dr. Partida about afib with RVR.





Discussed with Dr. Zhao , hospitalist who agreed to admit patient.  





Critical care time was [45] minutes which includes time at bedside, spent in 

discussion of patient's care with specialist and/or family members, with 

interpretation of laboratory and/or radiological studies and is exclusive of 

procedures.





Dragon Disclaimer:


Dragon Disclaimer:


This electronic medical record was generated, in whole or in part, using a voice

 recognition dictation system.





Departure


Departure


Impression:  


   Primary Impression:  


   Saddle embolism of pulmonary artery


   Additional Impressions:  


   Chest pain


   Atrial fibrillation with RVR


   Suspected COVID-19 virus infection


Disposition:  09 ADMITTED AS INPATIENT


Admitting Physician:  BHARAT CallesDR. ZHAO)


Condition:  IMPROVED


Referrals:  


UNKNOWN PCP NAME (PCP)





Justicifation of Admission Dx:


Justifications for Admission:


Justification of Admission Dx:  Yes











SPRING GREWAL DO                Aug 12, 2020 13:57

## 2020-08-13 VITALS — DIASTOLIC BLOOD PRESSURE: 68 MMHG | SYSTOLIC BLOOD PRESSURE: 128 MMHG

## 2020-08-13 VITALS — SYSTOLIC BLOOD PRESSURE: 131 MMHG | DIASTOLIC BLOOD PRESSURE: 60 MMHG

## 2020-08-13 VITALS — SYSTOLIC BLOOD PRESSURE: 114 MMHG | DIASTOLIC BLOOD PRESSURE: 67 MMHG

## 2020-08-13 VITALS — SYSTOLIC BLOOD PRESSURE: 118 MMHG | DIASTOLIC BLOOD PRESSURE: 57 MMHG

## 2020-08-13 VITALS — SYSTOLIC BLOOD PRESSURE: 124 MMHG | DIASTOLIC BLOOD PRESSURE: 89 MMHG

## 2020-08-13 VITALS — DIASTOLIC BLOOD PRESSURE: 68 MMHG | SYSTOLIC BLOOD PRESSURE: 122 MMHG

## 2020-08-13 VITALS — SYSTOLIC BLOOD PRESSURE: 128 MMHG | DIASTOLIC BLOOD PRESSURE: 67 MMHG

## 2020-08-13 VITALS — SYSTOLIC BLOOD PRESSURE: 128 MMHG | DIASTOLIC BLOOD PRESSURE: 75 MMHG

## 2020-08-13 VITALS — DIASTOLIC BLOOD PRESSURE: 77 MMHG | SYSTOLIC BLOOD PRESSURE: 125 MMHG

## 2020-08-13 VITALS — DIASTOLIC BLOOD PRESSURE: 68 MMHG | SYSTOLIC BLOOD PRESSURE: 141 MMHG

## 2020-08-13 VITALS — SYSTOLIC BLOOD PRESSURE: 140 MMHG | DIASTOLIC BLOOD PRESSURE: 92 MMHG

## 2020-08-13 VITALS — DIASTOLIC BLOOD PRESSURE: 63 MMHG | SYSTOLIC BLOOD PRESSURE: 118 MMHG

## 2020-08-13 VITALS — DIASTOLIC BLOOD PRESSURE: 67 MMHG | SYSTOLIC BLOOD PRESSURE: 116 MMHG

## 2020-08-13 VITALS — DIASTOLIC BLOOD PRESSURE: 70 MMHG | SYSTOLIC BLOOD PRESSURE: 130 MMHG

## 2020-08-13 VITALS — DIASTOLIC BLOOD PRESSURE: 66 MMHG | SYSTOLIC BLOOD PRESSURE: 114 MMHG

## 2020-08-13 VITALS — SYSTOLIC BLOOD PRESSURE: 150 MMHG | DIASTOLIC BLOOD PRESSURE: 62 MMHG

## 2020-08-13 VITALS — SYSTOLIC BLOOD PRESSURE: 130 MMHG | DIASTOLIC BLOOD PRESSURE: 63 MMHG

## 2020-08-13 VITALS — SYSTOLIC BLOOD PRESSURE: 131 MMHG | DIASTOLIC BLOOD PRESSURE: 68 MMHG

## 2020-08-13 VITALS — SYSTOLIC BLOOD PRESSURE: 132 MMHG | DIASTOLIC BLOOD PRESSURE: 68 MMHG

## 2020-08-13 VITALS — DIASTOLIC BLOOD PRESSURE: 54 MMHG | SYSTOLIC BLOOD PRESSURE: 110 MMHG

## 2020-08-13 VITALS — SYSTOLIC BLOOD PRESSURE: 65 MMHG | DIASTOLIC BLOOD PRESSURE: 48 MMHG

## 2020-08-13 VITALS — SYSTOLIC BLOOD PRESSURE: 140 MMHG | DIASTOLIC BLOOD PRESSURE: 66 MMHG

## 2020-08-13 VITALS — SYSTOLIC BLOOD PRESSURE: 114 MMHG | DIASTOLIC BLOOD PRESSURE: 65 MMHG

## 2020-08-13 VITALS — SYSTOLIC BLOOD PRESSURE: 137 MMHG | DIASTOLIC BLOOD PRESSURE: 62 MMHG

## 2020-08-13 VITALS — SYSTOLIC BLOOD PRESSURE: 146 MMHG | DIASTOLIC BLOOD PRESSURE: 71 MMHG

## 2020-08-13 LAB
ANION GAP SERPL CALC-SCNC: 11 MMOL/L (ref 6–14)
APTT PPP: YELLOW S
BACTERIA #/AREA URNS HPF: 0 /HPF
BILIRUB UR QL STRIP: NEGATIVE
BUN SERPL-MCNC: 21 MG/DL (ref 7–20)
CALCIUM SERPL-MCNC: 8.8 MG/DL (ref 8.5–10.1)
CHLORIDE SERPL-SCNC: 104 MMOL/L (ref 98–107)
CO2 SERPL-SCNC: 24 MMOL/L (ref 21–32)
CREAT SERPL-MCNC: 0.8 MG/DL (ref 0.6–1)
ERYTHROCYTE [DISTWIDTH] IN BLOOD BY AUTOMATED COUNT: 23.2 % (ref 11.5–14.5)
FIBRINOGEN PPP-MCNC: CLEAR MG/DL
GFR SERPLBLD BASED ON 1.73 SQ M-ARVRAT: 70.9 ML/MIN
GLUCOSE SERPL-MCNC: 105 MG/DL (ref 70–99)
HCT VFR BLD CALC: 30.9 % (ref 36–47)
HGB BLD-MCNC: 9.5 G/DL (ref 12–15.5)
MAGNESIUM SERPL-MCNC: 2.1 MG/DL (ref 1.8–2.4)
MCH RBC QN AUTO: 21 PG (ref 25–35)
MCHC RBC AUTO-ENTMCNC: 31 G/DL (ref 31–37)
MCV RBC AUTO: 68 FL (ref 79–100)
NITRITE UR QL STRIP: NEGATIVE
PH UR STRIP: 5 [PH]
PHOSPHATE SERPL-MCNC: 3.2 MG/DL (ref 2.6–4.7)
PLATELET # BLD AUTO: 381 X10^3/UL (ref 140–400)
POTASSIUM SERPL-SCNC: 3.8 MMOL/L (ref 3.5–5.1)
PROT UR STRIP-MCNC: NEGATIVE MG/DL
RBC # BLD AUTO: 4.53 X10^6/UL (ref 3.5–5.4)
RBC #/AREA URNS HPF: (no result) /HPF (ref 0–2)
SODIUM SERPL-SCNC: 139 MMOL/L (ref 136–145)
SQUAMOUS #/AREA URNS LPF: (no result) /LPF
UROBILINOGEN UR-MCNC: 0.2 MG/DL
WBC # BLD AUTO: 10.6 X10^3/UL (ref 4–11)
WBC #/AREA URNS HPF: (no result) /HPF (ref 0–4)

## 2020-08-13 PROCEDURE — 3E03317 INTRODUCTION OF OTHER THROMBOLYTIC INTO PERIPHERAL VEIN, PERCUTANEOUS APPROACH: ICD-10-PCS | Performed by: FAMILY MEDICINE

## 2020-08-13 RX ADMIN — CYCLOBENZAPRINE HYDROCHLORIDE SCH MG: 10 TABLET, FILM COATED ORAL at 20:59

## 2020-08-13 RX ADMIN — Medication PRN EACH: at 14:45

## 2020-08-13 RX ADMIN — FAMOTIDINE PRN MG: 20 TABLET ORAL at 23:29

## 2020-08-13 RX ADMIN — CYCLOBENZAPRINE HYDROCHLORIDE SCH MG: 10 TABLET, FILM COATED ORAL at 08:18

## 2020-08-13 RX ADMIN — DILTIAZEM HYDROCHLORIDE PRN MLS/HR: 5 INJECTION INTRAVENOUS at 09:56

## 2020-08-13 RX ADMIN — DILTIAZEM HYDROCHLORIDE PRN MLS/HR: 5 INJECTION INTRAVENOUS at 19:05

## 2020-08-13 RX ADMIN — CYCLOBENZAPRINE HYDROCHLORIDE SCH MG: 10 TABLET, FILM COATED ORAL at 14:00

## 2020-08-13 RX ADMIN — LEVOTHYROXINE SODIUM SCH MCG: 125 TABLET ORAL at 05:50

## 2020-08-13 RX ADMIN — DILTIAZEM HYDROCHLORIDE PRN MLS/HR: 5 INJECTION INTRAVENOUS at 00:09

## 2020-08-13 NOTE — PN
DATE:  08/13/2020



SUBJECTIVE:  The patient has progressively become more hypoxic since last night

and early this morning.  She is currently on BiPAP at 100% FiO2, saturations are

98-99%.  She is still hemodynamically stable.  She has evidence of markedly

increased troponin up to 31, suggesting severe RV ischemia.



PHYSICAL EXAMINATION:

GENERAL:  She is alert, awake, on 100% BiPAP.  Responds to questions.

VITAL SIGNS:  Blood pressure 130/63, pulse ox is 100% on 100% FiO2 with BiPAP.

NECK:  Supple.

LUNGS:  With diminished breath sounds bilaterally.

CARDIOVASCULAR:  With a regular rate.

ABDOMEN:  Soft.

EXTREMITIES:  With no pitting edema.



LABORATORY DATA:  Reviewed.  White cell count 10.6, hemoglobin 9.5 and platelets

are 381.  BUN is 21 and a creatinine of 0.8.  Troponin level is 31.9.



IMPRESSION:

1.  Acute hypoxic respiratory failure with worsening hypoxia, now on 100% FiO2

secondary to massive pulmonary embolism.

2.  Markedly increased troponin level secondary to severe right ventricular

infarction and ischemia.

3.  Atrial fibrillation with rapid ventricular response on admission, caused by

right ventricular strain.

4.  No history of hypercoagulable state.

5.  No absolute contraindication for TPA.



RECOMMENDATIONS:

1.  Discussed with the patient, her daughter and RN and RT.  At this point, she

is requiring 100% oxygen and her hypoxia has worsened.  She has evidence of

severe RV infarction with a troponin level of 31.  I did a stat echocardiogram

and it shows RV dilatation and no wall motion abnormality to suggest LV

ischemia.  She would be a candidate for TPA.  I have also discussed with Dr. Partida, Cardiology and he concurs with that.  I have also discussed with

Interventional Radiology, Dr. Patel and there is no mortality benefit with

catheter-directed TPA.  We will do systemic TPA, 100 mg of TPA over 2 hours.

2.  All the side effects of TPA including 4-5% chance of bleeding was explained

to the patient and the daughterKaren in detail and the benefits of TPA exceeds

the risk and they all agreed to proceed with it.

3.  At some point, she may need a left heart cath.

4.  Obtain venous Dopplers of lower extremities.

5.  Suspicion for COVID is low.  Awaiting the results.

6.  Critical care time 45 minutes including discussion with multiple physicians,

patient's daughter, the patient herself and decision making and review of the

labs, and data.

 



______________________________

UMU FRAZIER MD



DR:  Arron  JOB#:  [id number] / [id number]

DD:  08/13/2020 09:19  DT:  08/13/2020 09:37

## 2020-08-13 NOTE — CARD
MR#: [id number]

Account#: [id number]

Accession#: [id number]

Date of Study: 08/13/2020

Ordering Physician: UMU FRAZIER, 

Referring Physician: UMU FRAZIER, 

Tech: Rowena Nielson





--------------- APPROVED REPORT --------------





EXAM: Two-dimensional and M-mode echocardiogram with Doppler and color Doppler.



Other Information 

Quality : AverageHR: 104bpm



INDICATION

elevated Troponin



2D DIMENSIONS 

RVDd4.3 (2.9-3.5cm)Left Atrium(2D)4.0 (1.6-4.0cm)

IVSd1.4 (0.7-1.1cm)Aortic Root(2D)3.1 (2.0-3.7cm)

LVDd3.1 (3.9-5.9cm)LVOT Diameter1.8 (1.8-2.4cm)

PWd1.2 (0.7-1.1cm)LVDs2.6 (2.5-4.0cm)

FS (%) 18.8 %SV15.8 ml

LVEF(%)40.0 (>50%)



Aortic Valve

AoV Peak Eliseo.160.6cm/sAoV VTI23.5cm

AO Peak GR.10.3mmHgLVOT  VTI 15.04cm

AO Mean GR.6mmHg



TDI

Lateral E' P. V11.01cm/sMedial E' P. V9.14cm/s



Tricuspid Valve

TR P. Swzarvlg163mb/sRAP [id number]

TR Peak Gr.[id number]



 LEFT VENTRICLE 

The left ventricle is normal size. There is mild to moderate concentric left ventricular hypertrophy.
 The left ventricular systolic function is normal and the ejection fraction is within normal range. T
he Ejection Fraction is 50%. There is normal LV segmental wall motion. Tissue Doppler imaging reveals
 moderate left ventricular diastolic dysfunction.



 RIGHT VENTRICLE 

The right ventricle is moderately to severly dilated measuring 5.2 cm. There is normal right ventricu
lar wall thickness. The right ventricular systolic function is normal.



 ATRIA 

The left atrium is borderline dilated. The right atrium is moderately dilated. The interatrial septum
 bows toward left atrium consistent with elevated right atrial pressure. The interatrial septum is in
tact with no evidence for an atrial septal defect or patent foramen ovale as noted on 2-D or Doppler 
imaging.



 AORTIC VALVE 

The aortic valve is thickened but opens well. Doppler and Color Flow revealed no significant aortic r
egurgitation. There is no significant aortic valvular stenosis. Calculated aortic valve area is 1.69 
cm2 with maximum pressure gradient of 10 mmHg and mean pressure gradient of 6 mmHg.



 MITRAL VALVE 

The mitral valve is thickened but opens well. There is no evidence of mitral valve prolapse. There is
 no mitral valve stenosis. Doppler and Color-flow revealed trace mitral regurgitation.



 TRICUSPID VALVE 

The tricuspid valve is normal in structure and function. Doppler and Color Flow revealed mild to mode
rate tricuspid regurgitation with an estimated PAP of 71 mmHg. There is no tricuspid valve stenosis.



 PULMONIC VALVE 

The pulmonic valve is not well visualized. Doppler and Color Flow revealed trace to mild pulmonic derrek
vular regurgitation. There is no pulmonic valvular stenosis.



 GREAT VESSELS 

The aortic root is normal in size. The IVC is dilated.



 PERICARDIAL EFFUSION 

There is no evidence of significant pericardial effusion.



Critical Notification

Critical Value: No



<Conclusion>

The left ventricular systolic function is normal and the ejection fraction is within normal range. Th
e Ejection Fraction is 50%.

There is normal LV segmental wall motion.

The right ventricle is moderately to severly dilated measuring 5.2 cm.

Doppler and Color Flow revealed mild to moderate tricuspid regurgitation with an estimated PAP of 71 
mmHg.



Signed by : Angel Watts, 

Electronically Approved : 08/13/2020 11:06:49

## 2020-08-13 NOTE — RAD
INDICATION: Pulmonary embolus with evaluation for thrombus load within the

legs.

 

COMPARISON: None.

 

TECHNIQUE: Grayscale, color and doppler ultrasound images were obtained of

the bilateral lower extremity venous vasculature.

 

RIGHT:

 

No thrombus identified in the common femoral vein, femoral vein, popliteal

vein or visualized calf veins.

 

LEFT:

 

Thrombus is seen within the left leg extending from the common femoral 

through the superficial femoral, popliteal and into some of the calf 

veins.

 

IMPRESSION:

 

*   Deep vein thrombosis seen on the left.

 

Electronically signed by: Ezequiel Pulido MD (8/13/2020 7:44 PM) 

[id number]

## 2020-08-13 NOTE — NUR
SS following for discharge planning. SS reviewed pt chart and discussed with pt RN. Pt is 
from home and is currently on the BIPAP. COVID19 pending. Pt has clot in pulmonary artery 
and had TPA today. SS will continue to follow for discharge planning.

## 2020-08-13 NOTE — PDOC
[facility] PROGRESS NOTE


Date of Service


DOS:


DATE: 8/13/20 


TIME: 12:07





Chief Complaint


Chief Complaint


Acute hypoxic respiratory failure with worsening hypoxia secondary to saddle 

pulmonary embolism


Markedly increased troponin level secondary to severe right ventricular 

infarction and ischemia


Atrial fibrillation with rapid ventricular response induced by PE  


Hypertension


Hyperlipidemia 


Lumbar radiculopathy with significant decreased mobility with recent 

nontraumatic fall


Hypothyroidism


Microcytic anemia





History of Present Illness


History of Present Illness


08/13/20


Patient seen and examined in the ICU 


Hypoxia is worsening and she is requiring 100% oxygen on BiPAP


Troponin increased to 31, she has begun tPA per Dr. Patel 


Echo this AM showed normal LV wall motion, EF 50%, RV dilation secondary to 

tricuspid regurgitation 


Awaiting COVID results


Discussed with RN 


Chart reviewed





Vitals/I&O


Vitals/I&O:





                                   Vital Signs








  Date Time  Temp Pulse Resp B/P (MAP) Pulse Ox O2 Delivery O2 Flow Rate FiO2


 


8/13/20 10:15  106 33 131/68 (89) 100 BiPAP/CPAP  


 


8/13/20 08:00 97.9       





 97.9       


 


8/12/20 22:00       15.0 














                                    I & O   


 


 8/12/20 8/12/20 8/13/20





 15:00 23:00 07:00


 


Intake Total 1000 ml 50 ml 520.2 ml


 


Output Total  275 ml 285 ml


 


Balance 1000 ml -225 ml 235.2 ml











Physical Exam


General:  Alert, Oriented X3, Cooperative, mild distress


Heart:  Other (AFIB RVR)


Abdomen:  Normal bowel sounds, Soft, No tenderness


Extremities:  No clubbing, No cyanosis


Skin:  No rashes, No breakdown, No significant lesion





Labs


Labs:





Laboratory Tests








Test


 8/12/20


18:00 8/12/20


20:30 8/13/20


04:45 8/13/20


06:15


 


Troponin I Quantitative


 10.842 ng/mL


(0.000-0.055) 


 


 31.936 ng/mL


(0.000-0.055)


 


Heparin Anti-Xa Act,


Unfractionated 


 1.00 IU/mL


(0.30-0.70) 


 0.81 IU/mL


(0.30-0.70)


 


Urine Collection Type   Unknown  


 


Urine Color   Yellow  


 


Urine Clarity   Clear  


 


Urine pH   5.0 (<5.0-8.0)  


 


Urine Specific Gravity


 


 


 >=1.030


(1.000-1.030) 





 


Urine Protein


 


 


 Negative mg/dL


(NEG-TRACE) 





 


Urine Glucose (UA)


 


 


 Negative mg/dL


(NEG) 





 


Urine Ketones (Stick)   40 mg/dL (NEG)  


 


Urine Blood   Negative (NEG)  


 


Urine Nitrite   Negative (NEG)  


 


Urine Bilirubin   Negative (NEG)  


 


Urine Urobilinogen Dipstick


 


 


 0.2 mg/dL (0.2


mg/dL) 





 


Urine Leukocyte Esterase   Negative (NEG)  


 


Urine RBC


 


 


 6-10 /HPF


(0-2) 





 


Urine WBC


 


 


 5-10 /HPF


(0-4) 





 


Urine Squamous Epithelial


Cells 


 


 None /LPF 


 





 


Urine Bacteria   0 /HPF (0-FEW)  


 


Urine Mucus   Marked /LPF  


 


White Blood Count


 


 


 


 10.6 x10^3/uL


(4.0-11.0)


 


Red Blood Count


 


 


 


 4.53 x10^6/uL


(3.50-5.40)


 


Hemoglobin


 


 


 


 9.5 g/dL


(12.0-15.5)


 


Hematocrit


 


 


 


 30.9 %


(36.0-47.0)


 


Mean Corpuscular Volume    68 fL () 


 


Mean Corpuscular Hemoglobin    21 pg (25-35) 


 


Mean Corpuscular Hemoglobin


Concent 


 


 


 31 g/dL


(31-37)


 


Red Cell Distribution Width


 


 


 


 23.2 %


(11.5-14.5)


 


Platelet Count


 


 


 


 381 x10^3/uL


(140-400)


 


Sodium Level


 


 


 


 139 mmol/L


(136-145)


 


Potassium Level


 


 


 


 3.8 mmol/L


(3.5-5.1)


 


Chloride Level


 


 


 


 104 mmol/L


()


 


Carbon Dioxide Level


 


 


 


 24 mmol/L


(21-32)


 


Anion Gap    11 (6-14) 


 


Blood Urea Nitrogen


 


 


 


 21 mg/dL


(7-20)


 


Creatinine


 


 


 


 0.8 mg/dL


(0.6-1.0)


 


Estimated GFR


(Cockcroft-Gault) 


 


 


 70.9 





 


Glucose Level


 


 


 


 105 mg/dL


(70-99)


 


Calcium Level


 


 


 


 8.8 mg/dL


(8.5-10.1)


 


Phosphorus Level


 


 


 


 3.2 mg/dL


(2.6-4.7)


 


Magnesium Level


 


 


 


 2.1 mg/dL


(1.8-2.4)











Assessment and Plan


Assessmemt and Plan


Problems


Medical Problems:


(1) Atrial fibrillation with RVR


Status: Acute  





(2) Chest pain


Status: Acute  





(3) Saddle embolism of pulmonary artery


Status: Acute  





(4) Suspected COVID-19 virus infection


Status: Acute  








ASSESSMENT 


Acute hypoxic respiratory failure with worsening hypoxia secondary to saddle 

pulmonary embolism


Markedly increased troponin level secondary to severe right ventricular 

infarction and ischemia


Atrial fibrillation with rapid ventricular response induced by PE  


Hypertension


Hyperlipidemia 


Lumbar radiculopathy with significant decreased mobility with recent 

nontraumatic fall


Hypothyroidism


Microcytic anemia








PLAN 


Undergoing tPA 


Oxygen supplementation with BiPAP


Venous Doppler and SAMANTHA per cardiology 


Monitor hemoglobin 


Monitor ABGs 


Home meds 


Awaiting COVID results


Full code  


Appreciate cardiology and pulmonology input 


Total time 31 min





Comment


Review of Relevant


I have reviewed the following items donald (where applicable) has been applied.


Medications:





Current Medications








 Medications


  (Trade)  Dose


 Ordered  Sig/Mary


 Route


 PRN Reason  Start Time


 Stop Time Status Last Admin


Dose Admin


 


 Metoprolol


 Tartrate


  (Lopressor Vial)  5 mg  1X  ONCE


 IVP


   8/12/20 13:15


 8/12/20 13:16 DC 8/12/20 13:03





 


 Heparin Sodium/


 Dextrose  250 ml @ 0


 mls/hr  CONT  PRN


 IV


 PER PROTOCOL  8/12/20 13:30


 8/12/20 14:19 DC 8/12/20 13:47





 


 Heparin Sodium


  (Porcine)


  (Heparin Sodium)  2,150 unit  PRN Q6HRS  PRN


 IV


 FOR UFH LEVEL LESS THAN 0.2  8/12/20 13:30


 8/12/20 14:19 DC 8/12/20 13:45





 


 Ondansetron HCl


  (Zofran)  4 mg  PRN Q8HRS  PRN


 IV


 NAUSEA/VOMITING  8/12/20 13:45


 8/13/20 13:44  8/12/20 17:31





 


 Heparin Sodium/


 Dextrose  250 ml @ 0


 mls/hr  CONT  PRN


 IV


 PER PROTOCOL  8/12/20 14:15


    8/12/20 14:31





 


 Metoprolol


 Tartrate


  (Lopressor Vial)  5 mg  1X  ONCE


 IVP


   8/12/20 14:45


 8/12/20 14:46 DC 8/12/20 14:52





 


 Digoxin


  (Lanoxin)  500 mcg  1X  ONCE


 IV


   8/12/20 16:15


 8/12/20 16:16 DC 8/12/20 15:59





 


 Diltiazem HCl 125


 mg/Sodium Chloride  125 ml @ 5


 mls/hr  CONT  PRN


 IV


 SEE I/O RECORD  8/12/20 16:00


    8/13/20 09:56





 


 Levothyroxine


 Sodium


  (Synthroid)  125 mcg  DAILY06


 PO


   8/13/20 06:00


    8/13/20 05:50





 


 Alteplase,


 Recombinant  100 ml @ 


 50 mls/hr  1X  ONCE


 IV


   8/13/20 09:00


 8/13/20 10:59 DC 8/13/20 09:22














Justicifation of Admission Dx:


Justifications for Admission:


Justification of Admission Dx:  Yes











ARUN GARDINER III DO           Aug 13, 2020 12:23

## 2020-08-13 NOTE — PDOC
Provider Note


Provider Note


Note dictated


will give TPA for massive PE/ RV infarction





Justicifation of Admission Dx:


Justifications for Admission:


Justification of Admission Dx:  Yes











UMU FRAZIER MD                 Aug 13, 2020 09:20

## 2020-08-13 NOTE — EKG
[facility]

              [address] [place] [address]

Test Date:    2020               Test Time:    11:29:52

Pat Name:     LYLE JOHN              Department:   

Patient ID:   [id number]           Room:          

Gender:       F                        Technician:   

:          [id number]               Requested By: PSRING GREWAL

Order Number: [id number]           Reading MD:     

                                 Measurements

Intervals                              Axis          

Rate:         143                      P:            

NE:                                    QRS:          -15

QRSD:         76                       T:            64

QT:           304                                    

QTc:          475                                    

                           Interpretive Statements

IRREGULAR RHYTHM, NO P-WAVE FOUND

LEFTWARD AXIS

ST & T ABNORMALITY, CONSIDER

HIGH LATERAL ISCHEMIA OR LEFT VENTRICULAR STRAIN

ABNORMAL ECG

RI6.02

No previous ECG available for comparison

## 2020-08-13 NOTE — PDOC
PROGRESS NOTES


Date of Service:


DATE: 8/13/20 


TIME: 16:38





Subjective


Subjective


s/p tPA earlier today, feeling better, on O2 5L, denied any CP





Objective


Objective





Vital Signs








  Date Time  Temp Pulse Resp B/P (MAP) Pulse Ox O2 Delivery O2 Flow Rate FiO2


 


8/13/20 16:07     93 Nasal Cannula 5.0 


 


8/13/20 15:00  100 22 116/67 (83)    


 


8/13/20 12:00 98.7       





 98.7       














Intake and Output 


 


 8/13/20





 07:00


 


Intake Total 1570.2 ml


 


Output Total 560 ml


 


Balance 1010.2 ml


 


 


 


Intake Oral 220 ml


 


IV Total 1350.2 ml


 


Output Urine Total 560 ml











Physical Exam


Abdomen:  Normal bowel sounds, Soft, No tenderness


Heart:  Other (AFIB RVR)


Extremities:  No clubbing, No cyanosis


General:  Alert, Oriented X3, Cooperative, mild distress


HEENT:  Atraumatic, EOMI, Mucous membr. moist/pink


Lungs:  Normal air movement


MUSCULOSKELETAL:  Osteoarthritic changes both hands


Neuro:  Normal speech, Cranial nerves 3-12 NL


Psych/Mental Status:  Mental status NL, Mood NL


Skin:  No rashes, No breakdown, No significant lesion





Assessment


Assessment


1. NSTEMI: prob RV strain from saddle PE, but since top significantly elevated, 

we will plan cath once more stable, possibly monday.  LVEF 50% on echo


2. Acute resp failure secondary to Saddle PE s/p tPA, doing better.  Pulm 

following


3. AFIB RVR: induced by PE, HR better controlled, change CAM to PO.  


4. Hypothyroidism: per IM





Plan


Plan of Care


Problems


Medical Problems:


(1) Atrial fibrillation with RVR


Status: Acute  





(2) Chest pain


Status: Acute  





(3) Saddle embolism of pulmonary artery


Status: Acute  





(4) Suspected COVID-19 virus infection


Status: Acute  











Comment


Review of Relevant


I have reviewed the following items donald (where applicable) has been applied.


Labs





Laboratory Tests








Test


 8/12/20


18:00 8/12/20


20:30 8/13/20


04:45 8/13/20


06:15


 


Troponin I Quantitative


 10.842 ng/mL


(0.000-0.055) 


 


 31.936 ng/mL


(0.000-0.055)


 


Heparin Anti-Xa Act,


Unfractionated 


 1.00 IU/mL


(0.30-0.70) 


 0.81 IU/mL


(0.30-0.70)


 


Urine Collection Type   Unknown  


 


Urine Color   Yellow  


 


Urine Clarity   Clear  


 


Urine pH   5.0 (<5.0-8.0)  


 


Urine Specific Gravity


 


 


 >=1.030


(1.000-1.030) 





 


Urine Protein


 


 


 Negative mg/dL


(NEG-TRACE) 





 


Urine Glucose (UA)


 


 


 Negative mg/dL


(NEG) 





 


Urine Ketones (Stick)   40 mg/dL (NEG)  


 


Urine Blood   Negative (NEG)  


 


Urine Nitrite   Negative (NEG)  


 


Urine Bilirubin   Negative (NEG)  


 


Urine Urobilinogen Dipstick


 


 


 0.2 mg/dL (0.2


mg/dL) 





 


Urine Leukocyte Esterase   Negative (NEG)  


 


Urine RBC


 


 


 6-10 /HPF


(0-2) 





 


Urine WBC


 


 


 5-10 /HPF


(0-4) 





 


Urine Squamous Epithelial


Cells 


 


 None /LPF 


 





 


Urine Bacteria   0 /HPF (0-FEW)  


 


Urine Mucus   Marked /LPF  


 


White Blood Count


 


 


 


 10.6 x10^3/uL


(4.0-11.0)


 


Red Blood Count


 


 


 


 4.53 x10^6/uL


(3.50-5.40)


 


Hemoglobin


 


 


 


 9.5 g/dL


(12.0-15.5)


 


Hematocrit


 


 


 


 30.9 %


(36.0-47.0)


 


Mean Corpuscular Volume    68 fL () 


 


Mean Corpuscular Hemoglobin    21 pg (25-35) 


 


Mean Corpuscular Hemoglobin


Concent 


 


 


 31 g/dL


(31-37)


 


Red Cell Distribution Width


 


 


 


 23.2 %


(11.5-14.5)


 


Platelet Count


 


 


 


 381 x10^3/uL


(140-400)


 


Sodium Level


 


 


 


 139 mmol/L


(136-145)


 


Potassium Level


 


 


 


 3.8 mmol/L


(3.5-5.1)


 


Chloride Level


 


 


 


 104 mmol/L


()


 


Carbon Dioxide Level


 


 


 


 24 mmol/L


(21-32)


 


Anion Gap    11 (6-14) 


 


Blood Urea Nitrogen


 


 


 


 21 mg/dL


(7-20)


 


Creatinine


 


 


 


 0.8 mg/dL


(0.6-1.0)


 


Estimated GFR


(Cockcroft-Gault) 


 


 


 70.9 





 


Glucose Level


 


 


 


 105 mg/dL


(70-99)


 


Calcium Level


 


 


 


 8.8 mg/dL


(8.5-10.1)


 


Phosphorus Level


 


 


 


 3.2 mg/dL


(2.6-4.7)


 


Magnesium Level


 


 


 


 2.1 mg/dL


(1.8-2.4)


 


Test


 8/13/20


11:42 


 


 





 


Troponin I Quantitative


 26.727 ng/mL


(0.000-0.055) 


 


 











Microbiology


8/12/20 Blood Culture - Preliminary, Resulted


          NO GROWTH AFTER 1 DAY


Medications





Current Medications


Alprazolam (Xanax) 0.25 mg PRN Q6HRS  PRN PO ANXIETY / AGITATION;  Start 8/13/20

at 15:30


Alteplase, Recombinant 100 ml @  50 mls/hr 1X  ONCE IV  Last administered on 

8/13/20at 09:22;  Start 8/13/20 at 09:00;  Stop 8/13/20 at 10:59;  Status DC


Cyclobenzaprine HCl (Flexeril) 10 mg TID PO ;  Start 8/12/20 at 21:00


Info (Anti-Coagulation Monitoring By Pharmacy) 1 each PRN DAILY  PRN [facility] SEE 

COMMENTS Last administered on 8/13/20at 14:45;  Start 8/13/20 at 08:30


Levothyroxine Sodium (Synthroid) 125 mcg DAILY06 PO  Last administered on 

8/13/20at 05:50;  Start 8/13/20 at 06:00


Tramadol HCl (Ultram) 50 mg PRN DAILY  PRN PO PAIN;  Start 8/12/20 at 18:45


Vitals/I & O





Vital Sign - Last 24 Hours








 8/12/20 8/12/20 8/12/20 8/12/20





 16:54 18:00 18:15 18:30


 


Temp  98.5  





  98.5  


 


Pulse 132 128 124 124


 


Resp  20 20 20


 


B/P (MAP) 152/107 (122) 141/77 (98) 145/82 (103) 


 


Pulse Ox 99 95 95 95


 


O2 Delivery NonRebreather Mask NonRebreather Mask NonRebreather Mask 

NonRebreather Mask


 


O2 Flow Rate 15.0 15.0 15.0 15.0


 


    





    





 8/12/20 8/12/20 8/12/20 8/12/20





 18:45 19:00 19:00 20:00


 


Temp    98.7





    98.7


 


Pulse 126 120 120 112


 


Resp 20 24 20 32


 


B/P (MAP) 145/108 (120) 134/89 (104) 134/89 (104) 166/98 (120)


 


Pulse Ox 95 98 95 92


 


O2 Delivery NonRebreather Mask NonRebreather Mask NonRebreather Mask 

NonRebreather Mask


 


O2 Flow Rate 15.0 15.0 15.0 15.0


 


    





    





 8/12/20 8/12/20 8/12/20 8/12/20





 20:00 21:00 22:00 22:30


 


Pulse  118 100 


 


Resp  30 24 


 


B/P (MAP)  140/81 (100) 114/71 (85) 


 


Pulse Ox  90 70 99


 


O2 Delivery Non-Rebreather NonRebreather Mask NonRebreather Mask BiPAP/CPAP


 


O2 Flow Rate 15.0 15.0 15.0 





 8/12/20 8/12/20 8/12/20 8/13/20





 23:00 23:50 23:59 00:01


 


Temp    98.0





    98.0


 


Pulse 114   105


 


Resp 24   26


 


B/P (MAP) 144/86 (105)   124/89 (101)


 


Pulse Ox 95 95  93


 


O2 Delivery BiPAP/CPAP BiPAP/CPAP Bi-pap BiPAP/CPAP


 


    





    





 8/13/20 8/13/20 8/13/20 8/13/20





 01:00 02:00 03:00 03:45


 


Pulse 80 87 102 


 


Resp 26 26 24 


 


B/P (MAP) 140/92 (108) 146/71 (96) 125/77 (93) 


 


Pulse Ox 93 93 99 100


 


O2 Delivery BiPAP/CPAP BiPAP/CPAP BiPAP/CPAP BiPAP/CPAP





 8/13/20 8/13/20 8/13/20 8/13/20





 04:00 04:00 05:00 06:00


 


Temp  98.9  





  98.9  


 


Pulse  104 106 121


 


Resp  24 24 24


 


B/P (MAP)  128/67 (87) 114/66 (82) 130/70 (90)


 


Pulse Ox  99 99 99


 


O2 Delivery Bi-pap BiPAP/CPAP BiPAP/CPAP BiPAP/CPAP


 


    





    





 8/13/20 8/13/20 8/13/20 8/13/20





 07:00 08:00 08:00 08:45


 


Temp  97.9  





  97.9  


 


Pulse 112 110  


 


Resp 30 30  


 


B/P (MAP) 130/63 (85) 128/68 (88)  


 


Pulse Ox 100 100  99


 


O2 Delivery BiPAP/CPAP BiPAP/CPAP Bi-pap BiPAP/CPAP


 


    





    





 8/13/20 8/13/20 8/13/20 8/13/20





 09:00 09:15 09:30 09:45


 


Pulse 114 112 114 110


 


Resp 30 30 33 31


 


B/P (MAP) 128/75 (92) 140/66 (90) 114/67 (83) 65/48 (54)


 


Pulse Ox 100 100 100 100


 


O2 Delivery BiPAP/CPAP BiPAP/CPAP BiPAP/CPAP BiPAP/CPAP





 8/13/20 8/13/20 8/13/20 8/13/20





 10:00 10:15 12:00 12:00


 


Temp    98.7





    98.7


 


Pulse 110 106  110


 


Resp 30 33  35


 


B/P (MAP) 114/67 (83) 131/68 (89)  137/62 (87)


 


Pulse Ox 100 100  100


 


O2 Delivery BiPAP/CPAP BiPAP/CPAP Bi-pap BiPAP/CPAP


 


    





    





 8/13/20 8/13/20 8/13/20 8/13/20





 12:25 13:00 14:00 15:00


 


Pulse  105 104 100


 


Resp  40 40 22


 


B/P (MAP)  118/57 (77) 118/63 (81) 116/67 (83)


 


Pulse Ox 100 100 100 96


 


O2 Delivery BiPAP/CPAP BiPAP/CPAP BiPAP/CPAP Nasal Cannula


 


O2 Flow Rate    5.0





 8/13/20   





 16:07   


 


Pulse Ox 93   


 


O2 Delivery Nasal Cannula   


 


O2 Flow Rate 5.0   














Intake and Output   


 


 8/12/20 8/12/20 8/13/20





 15:00 23:00 07:00


 


Intake Total 1000 ml 50 ml 520.2 ml


 


Output Total  275 ml 285 ml


 


Balance 1000 ml -225 ml 235.2 ml

















MESSI LARKIN MD           Aug 13, 2020 16:38

## 2020-08-14 VITALS — DIASTOLIC BLOOD PRESSURE: 71 MMHG | SYSTOLIC BLOOD PRESSURE: 122 MMHG

## 2020-08-14 VITALS — SYSTOLIC BLOOD PRESSURE: 114 MMHG | DIASTOLIC BLOOD PRESSURE: 62 MMHG

## 2020-08-14 VITALS — DIASTOLIC BLOOD PRESSURE: 68 MMHG | SYSTOLIC BLOOD PRESSURE: 138 MMHG

## 2020-08-14 VITALS — DIASTOLIC BLOOD PRESSURE: 58 MMHG | SYSTOLIC BLOOD PRESSURE: 137 MMHG

## 2020-08-14 VITALS — SYSTOLIC BLOOD PRESSURE: 149 MMHG | DIASTOLIC BLOOD PRESSURE: 68 MMHG

## 2020-08-14 VITALS — SYSTOLIC BLOOD PRESSURE: 100 MMHG | DIASTOLIC BLOOD PRESSURE: 78 MMHG

## 2020-08-14 VITALS — DIASTOLIC BLOOD PRESSURE: 59 MMHG | SYSTOLIC BLOOD PRESSURE: 109 MMHG

## 2020-08-14 VITALS — SYSTOLIC BLOOD PRESSURE: 115 MMHG | DIASTOLIC BLOOD PRESSURE: 72 MMHG

## 2020-08-14 VITALS — DIASTOLIC BLOOD PRESSURE: 57 MMHG | SYSTOLIC BLOOD PRESSURE: 122 MMHG

## 2020-08-14 VITALS — DIASTOLIC BLOOD PRESSURE: 72 MMHG | SYSTOLIC BLOOD PRESSURE: 132 MMHG

## 2020-08-14 VITALS — SYSTOLIC BLOOD PRESSURE: 130 MMHG | DIASTOLIC BLOOD PRESSURE: 69 MMHG

## 2020-08-14 VITALS — DIASTOLIC BLOOD PRESSURE: 102 MMHG | SYSTOLIC BLOOD PRESSURE: 140 MMHG

## 2020-08-14 VITALS — SYSTOLIC BLOOD PRESSURE: 131 MMHG | DIASTOLIC BLOOD PRESSURE: 60 MMHG

## 2020-08-14 VITALS — DIASTOLIC BLOOD PRESSURE: 72 MMHG | SYSTOLIC BLOOD PRESSURE: 110 MMHG

## 2020-08-14 VITALS — SYSTOLIC BLOOD PRESSURE: 153 MMHG | DIASTOLIC BLOOD PRESSURE: 73 MMHG

## 2020-08-14 VITALS — SYSTOLIC BLOOD PRESSURE: 137 MMHG | DIASTOLIC BLOOD PRESSURE: 62 MMHG

## 2020-08-14 VITALS — DIASTOLIC BLOOD PRESSURE: 73 MMHG | SYSTOLIC BLOOD PRESSURE: 148 MMHG

## 2020-08-14 LAB
ANION GAP SERPL CALC-SCNC: 6 MMOL/L (ref 6–14)
BASOPHILS # BLD AUTO: 0.1 X10^3/UL (ref 0–0.2)
BASOPHILS NFR BLD: 1 % (ref 0–3)
BUN SERPL-MCNC: 23 MG/DL (ref 7–20)
CALCIUM SERPL-MCNC: 8.1 MG/DL (ref 8.5–10.1)
CHLORIDE SERPL-SCNC: 103 MMOL/L (ref 98–107)
CO2 SERPL-SCNC: 27 MMOL/L (ref 21–32)
CREAT SERPL-MCNC: 1.1 MG/DL (ref 0.6–1)
EOSINOPHIL NFR BLD: 0.2 X10^3/UL (ref 0–0.7)
EOSINOPHIL NFR BLD: 2 % (ref 0–3)
ERYTHROCYTE [DISTWIDTH] IN BLOOD BY AUTOMATED COUNT: 22.7 % (ref 11.5–14.5)
GFR SERPLBLD BASED ON 1.73 SQ M-ARVRAT: 49.1 ML/MIN
GLUCOSE SERPL-MCNC: 134 MG/DL (ref 70–99)
HCT VFR BLD CALC: 26.7 % (ref 36–47)
HGB BLD-MCNC: 8.4 G/DL (ref 12–15.5)
LYMPHOCYTES # BLD: 1.7 X10^3/UL (ref 1–4.8)
LYMPHOCYTES NFR BLD AUTO: 16 % (ref 24–48)
MCH RBC QN AUTO: 21 PG (ref 25–35)
MCHC RBC AUTO-ENTMCNC: 31 G/DL (ref 31–37)
MCV RBC AUTO: 67 FL (ref 79–100)
MONO #: 0.6 X10^3/UL (ref 0–1.1)
MONOCYTES NFR BLD: 6 % (ref 0–9)
NEUT #: 7.6 X10^3/UL (ref 1.8–7.7)
NEUTROPHILS NFR BLD AUTO: 75 % (ref 31–73)
PLATELET # BLD AUTO: 327 X10^3/UL (ref 140–400)
POTASSIUM SERPL-SCNC: 3.5 MMOL/L (ref 3.5–5.1)
RBC # BLD AUTO: 3.99 X10^6/UL (ref 3.5–5.4)
SODIUM SERPL-SCNC: 136 MMOL/L (ref 136–145)
WBC # BLD AUTO: 10.2 X10^3/UL (ref 4–11)

## 2020-08-14 PROCEDURE — 5A09357 ASSISTANCE WITH RESPIRATORY VENTILATION, LESS THAN 24 CONSECUTIVE HOURS, CONTINUOUS POSITIVE AIRWAY PRESSURE: ICD-10-PCS | Performed by: FAMILY MEDICINE

## 2020-08-14 RX ADMIN — CYCLOBENZAPRINE HYDROCHLORIDE SCH MG: 10 TABLET, FILM COATED ORAL at 14:15

## 2020-08-14 RX ADMIN — FAMOTIDINE PRN MG: 20 TABLET ORAL at 21:37

## 2020-08-14 RX ADMIN — CYCLOBENZAPRINE HYDROCHLORIDE SCH MG: 10 TABLET, FILM COATED ORAL at 20:23

## 2020-08-14 RX ADMIN — LEVALBUTEROL PRN MG: 1.25 SOLUTION, CONCENTRATE RESPIRATORY (INHALATION) at 12:26

## 2020-08-14 RX ADMIN — DILTIAZEM HYDROCHLORIDE PRN MLS/HR: 5 INJECTION INTRAVENOUS at 03:24

## 2020-08-14 RX ADMIN — CYCLOBENZAPRINE HYDROCHLORIDE SCH MG: 10 TABLET, FILM COATED ORAL at 09:36

## 2020-08-14 RX ADMIN — Medication PRN EACH: at 13:21

## 2020-08-14 RX ADMIN — LEVALBUTEROL PRN MG: 1.25 SOLUTION, CONCENTRATE RESPIRATORY (INHALATION) at 20:05

## 2020-08-14 RX ADMIN — FAMOTIDINE PRN MG: 20 TABLET ORAL at 09:36

## 2020-08-14 RX ADMIN — BUDESONIDE SCH MG: 0.5 INHALANT RESPIRATORY (INHALATION) at 20:05

## 2020-08-14 RX ADMIN — LEVOTHYROXINE SODIUM SCH MCG: 125 TABLET ORAL at 05:48

## 2020-08-14 RX ADMIN — BUDESONIDE SCH MG: 0.5 INHALANT RESPIRATORY (INHALATION) at 12:26

## 2020-08-14 NOTE — PDOC
CARDIOLOGY PROGRESS NOTE


SUBJECTIVE:


No new events overnight. 


Denies any chest pain. 


Dyspnea continues.





OBJECTIVE:


Vital Signs/I&O:





                                   Vital Signs








  Date Time  Temp Pulse Resp B/P (MAP) Pulse Ox O2 Delivery O2 Flow Rate FiO2


 


8/14/20 23:00 98.1 124 26 140/102 (115) 99 Nasal Cannula 5.0 





 98.1       














                                    I & O   


 


 8/13/20 8/13/20 8/14/20





 15:00 23:00 07:00


 


Intake Total 140 ml 472 ml 438 ml


 


Output Total 300 ml 285 ml 175 ml


 


Balance -160 ml 187 ml 263 ml








Objective:


GEN.:    No apparent distress.  Alert and oriented.


HEENT:    Head is normocephalic, atraumatic


NECK:    Supple.  


LUNGS:    Clear to auscultation.


HEART:    RRR, S1, S2 present.  Peripheral pulses intact


ABDOMEN:    Soft, nontender.  Positive bowel sounds.


EXTREMITIES:    Without any cyanosis.    


NEUROLOGIC:     Normal speech, normal tone


PSYCHIATRIC:    Normal affect, normal mood.


SKIN:   No ulcerations





CURRENT MEDICATIONS:





Current Medications








 Medications


  (Trade)  Dose


 Ordered  Sig/Mary


 Route


 PRN Reason  Start Time


 Stop Time Status Last Admin


Dose Admin


 


 Budesonide


  (Pulmicort)  0.5 mg  RTBID


 NEB


   8/14/20 09:00


    8/14/20 20:05





 


 Levalbuterol HCl


  (Xopenex)  1.25 mg  PRN Q4HRS  PRN


 NEB


 SHORTNESS OF BREATH  8/14/20 09:00


    8/14/20 20:05





 


 Diltiazem HCl


  (Cardizem 24hr


 Cd)  240 mg  DAILY


 PO


   8/14/20 09:15


    8/14/20 11:27














DIAGNOSTIC TESTING:


Labs reivewed


Labs:


                                Laboratory Tests


8/14/20 03:00











Laboratory Tests








Test


 8/14/20


03:00


 


White Blood Count


 10.2 x10^3/uL


(4.0-11.0)


 


Red Blood Count


 3.99 x10^6/uL


(3.50-5.40)


 


Hemoglobin


 8.4 g/dL


(12.0-15.5)  L


 


Hematocrit


 26.7 %


(36.0-47.0)  L


 


Mean Corpuscular Volume


 67 fL ()


L


 


Mean Corpuscular Hemoglobin


 21 pg (25-35)


L


 


Mean Corpuscular Hemoglobin


Concent 31 g/dL


(31-37)


 


Red Cell Distribution Width


 22.7 %


(11.5-14.5)  H


 


Platelet Count


 327 x10^3/uL


(140-400)


 


Neutrophils (%) (Auto) 75 % (31-73)  H


 


Lymphocytes (%) (Auto) 16 % (24-48)  L


 


Monocytes (%) (Auto) 6 % (0-9)  


 


Eosinophils (%) (Auto) 2 % (0-3)  


 


Basophils (%) (Auto) 1 % (0-3)  


 


Neutrophils # (Auto)


 7.6 x10^3/uL


(1.8-7.7)


 


Lymphocytes # (Auto)


 1.7 x10^3/uL


(1.0-4.8)


 


Monocytes # (Auto)


 0.6 x10^3/uL


(0.0-1.1)


 


Eosinophils # (Auto)


 0.2 x10^3/uL


(0.0-0.7)


 


Basophils # (Auto)


 0.1 x10^3/uL


(0.0-0.2)


 


Heparin Anti-Xa Act,


Unfractionated 0.70 IU/mL


(0.30-0.70)


 


Sodium Level


 136 mmol/L


(136-145)


 


Potassium Level


 3.5 mmol/L


(3.5-5.1)


 


Chloride Level


 103 mmol/L


()


 


Carbon Dioxide Level


 27 mmol/L


(21-32)


 


Anion Gap 6 (6-14)  


 


Blood Urea Nitrogen


 23 mg/dL


(7-20)  H


 


Creatinine


 1.1 mg/dL


(0.6-1.0)  H


 


Estimated GFR


(Cockcroft-Gault) 49.1  





 


Glucose Level


 134 mg/dL


(70-99)  H


 


Calcium Level


 8.1 mg/dL


(8.5-10.1)  L











ASSESSMENT:


1. Massive P.E -improving


2. NSTEMI





PLAN:


1. Continue heparin gtt. Will consider coronary angiography monday. Thanks.





Justicifation of Admission Dx:


Justifications for Admission:


Justification of Admission Dx:  Yes











ALMA DELIA SINGH MD       Aug 14, 2020 23:43

## 2020-08-14 NOTE — PDOC
PULMONARY PROGRESS NOTES


DATE: 8/14/20 


TIME: 08:29


Subjective


Pt. is S/P systemic TPA on 8/13/2020, now on N/C oxygen 


no SOB, or cough or CP


Vitals





Vital Signs








  Date Time  Temp Pulse Resp B/P (MAP) Pulse Ox O2 Delivery O2 Flow Rate FiO2


 


8/14/20 06:00  98 26 149/68 (95) 95 Nasal Cannula 5.0 


 


8/14/20 04:00 98.4       





 98.4       








ROS:  No Nausea, No Chest Pain, No Abdominal Pain, No Increase Cough


General:  Alert


Lungs:  Clear


Cardiovascular:  S1, S2


Abdomen:  Soft, Non-tender


Neuro Exam:  Alert


Extremities:  No Edema


Skin:  Warm


Labs





Laboratory Tests








Test


 8/12/20


12:05 8/12/20


12:14 8/12/20


18:00 8/12/20


20:30


 


White Blood Count


 10.9 x10^3/uL


(4.0-11.0) 


 


 





 


Red Blood Count


 4.71 x10^6/uL


(3.50-5.40) 


 


 





 


Hemoglobin


 9.7 g/dL


(12.0-15.5) 


 


 





 


Hematocrit


 31.8 %


(36.0-47.0) 


 


 





 


Mean Corpuscular Volume 68 fL ()    


 


Mean Corpuscular Hemoglobin 21 pg (25-35)    


 


Mean Corpuscular Hemoglobin


Concent 31 g/dL


(31-37) 


 


 





 


Red Cell Distribution Width


 23.3 %


(11.5-14.5) 


 


 





 


Platelet Count


 400 x10^3/uL


(140-400) 


 


 





 


Neutrophils (%) (Auto) 84 % (31-73)    


 


Lymphocytes (%) (Auto) 10 % (24-48)    


 


Monocytes (%) (Auto) 5 % (0-9)    


 


Eosinophils (%) (Auto) 0 % (0-3)    


 


Basophils (%) (Auto) 1 % (0-3)    


 


Neutrophils # (Auto)


 9.2 x10^3/uL


(1.8-7.7) 


 


 





 


Lymphocytes # (Auto)


 1.1 x10^3/uL


(1.0-4.8) 


 


 





 


Monocytes # (Auto)


 0.5 x10^3/uL


(0.0-1.1) 


 


 





 


Eosinophils # (Auto)


 0.0 x10^3/uL


(0.0-0.7) 


 


 





 


Basophils # (Auto)


 0.1 x10^3/uL


(0.0-0.2) 


 


 





 


Toxic Granulation Slight    


 


Platelet Estimate


 Adequate


(ADEQUATE) 


 


 





 


Polychromasia Slight    


 


Hypochromasia Mod    


 


Anisocytosis Mod    


 


Microcytosis Marked    


 


Ovalocytes Few    


 


Schistocytes     


 


Prothrombin Time


 13.4 SEC


(11.7-14.0) 


 


 





 


Prothromb Time International


Ratio 1.1 (0.8-1.1) 


 


 


 





 


Activated Partial


Thromboplast Time 34 SEC (24-38) 


 


 


 





 


D-Dimer (Elizabeth)


 6.57 ug/mlFEU


(0.00-0.50) 


 


 





 


Sodium Level


 141 mmol/L


(136-145) 


 


 





 


Potassium Level


 4.0 mmol/L


(3.5-5.1) 


 


 





 


Chloride Level


 104 mmol/L


() 


 


 





 


Carbon Dioxide Level


 26 mmol/L


(21-32) 


 


 





 


Anion Gap 11 (6-14)    


 


Blood Urea Nitrogen


 24 mg/dL


(7-20) 


 


 





 


Creatinine


 1.0 mg/dL


(0.6-1.0) 


 


 





 


Estimated GFR


(Cockcroft-Gault) 54.8 


 


 


 





 


BUN/Creatinine Ratio 24 (6-20)    


 


Glucose Level


 150 mg/dL


(70-99) 


 


 





 


Lactic Acid Level


 2.0 mmol/L


(0.4-2.0) 


 


 





 


Calcium Level


 9.2 mg/dL


(8.5-10.1) 


 


 





 


Magnesium Level


 2.0 mg/dL


(1.8-2.4) 


 


 





 


Total Bilirubin


 0.7 mg/dL


(0.2-1.0) 


 


 





 


Aspartate Amino Transf


(AST/SGOT) 26 U/L (15-37) 


 


 


 





 


Alanine Aminotransferase


(ALT/SGPT) 25 U/L (14-59) 


 


 


 





 


Alkaline Phosphatase


 123 U/L


() 


 


 





 


Troponin I Quantitative


 0.743 ng/mL


(0.000-0.055) 


 10.842 ng/mL


(0.000-0.055) 





 


Total Protein


 7.4 g/dL


(6.4-8.2) 


 


 





 


Albumin


 3.1 g/dL


(3.4-5.0) 


 


 





 


Albumin/Globulin Ratio 0.7 (1.0-1.7)    


 


Triglycerides Level


 78 mg/dL


(0-150) 


 


 





 


Cholesterol Level


 130 mg/dL


(0-200) 


 


 





 


LDL Cholesterol, Calculated


 54 mg/dL


(0-100) 


 


 





 


VLDL Cholesterol, Calculated


 16 mg/dL


(0-40) 


 


 





 


Non-HDL Cholesterol Calculated


 70 mg/dL


(0-129) 


 


 





 


HDL Cholesterol


 60 mg/dL


(40-60) 


 


 





 


Cholesterol/HDL Ratio 2.2    


 


Lipase


 89 U/L


() 


 


 





 


Thyroid Stimulating Hormone


(TSH) 5.058 uIU/mL


(0.358-3.74) 


 


 





 


Coronavirus (PCR)


 


 Not detected


(Not Detected) 


 





 


Heparin Anti-Xa Act,


Unfractionated 


 


 


 1.00 IU/mL


(0.30-0.70)


 


Test


 8/13/20


04:45 8/13/20


06:15 8/13/20


11:42 8/13/20


21:00


 


Urine Collection Type Unknown    


 


Urine Color Yellow    


 


Urine Clarity Clear    


 


Urine pH 5.0 (<5.0-8.0)    


 


Urine Specific Gravity


 >=1.030


(1.000-1.030) 


 


 





 


Urine Protein


 Negative mg/dL


(NEG-TRACE) 


 


 





 


Urine Glucose (UA)


 Negative mg/dL


(NEG) 


 


 





 


Urine Ketones (Stick) 40 mg/dL (NEG)    


 


Urine Blood Negative (NEG)    


 


Urine Nitrite Negative (NEG)    


 


Urine Bilirubin Negative (NEG)    


 


Urine Urobilinogen Dipstick


 0.2 mg/dL (0.2


mg/dL) 


 


 





 


Urine Leukocyte Esterase Negative (NEG)    


 


Urine RBC


 6-10 /HPF


(0-2) 


 


 





 


Urine WBC


 5-10 /HPF


(0-4) 


 


 





 


Urine Squamous Epithelial


Cells None /LPF 


 


 


 





 


Urine Bacteria 0 /HPF (0-FEW)    


 


Urine Mucus Marked /LPF    


 


White Blood Count


 


 10.6 x10^3/uL


(4.0-11.0) 


 





 


Red Blood Count


 


 4.53 x10^6/uL


(3.50-5.40) 


 





 


Hemoglobin


 


 9.5 g/dL


(12.0-15.5) 


 





 


Hematocrit


 


 30.9 %


(36.0-47.0) 


 





 


Mean Corpuscular Volume  68 fL ()   


 


Mean Corpuscular Hemoglobin  21 pg (25-35)   


 


Mean Corpuscular Hemoglobin


Concent 


 31 g/dL


(31-37) 


 





 


Red Cell Distribution Width


 


 23.2 %


(11.5-14.5) 


 





 


Platelet Count


 


 381 x10^3/uL


(140-400) 


 





 


Heparin Anti-Xa Act,


Unfractionated 


 0.81 IU/mL


(0.30-0.70) 


 0.52 IU/mL


(0.30-0.70)


 


Sodium Level


 


 139 mmol/L


(136-145) 


 





 


Potassium Level


 


 3.8 mmol/L


(3.5-5.1) 


 





 


Chloride Level


 


 104 mmol/L


() 


 





 


Carbon Dioxide Level


 


 24 mmol/L


(21-32) 


 





 


Anion Gap  11 (6-14)   


 


Blood Urea Nitrogen


 


 21 mg/dL


(7-20) 


 





 


Creatinine


 


 0.8 mg/dL


(0.6-1.0) 


 





 


Estimated GFR


(Cockcroft-Gault) 


 70.9 


 


 





 


Glucose Level


 


 105 mg/dL


(70-99) 


 





 


Calcium Level


 


 8.8 mg/dL


(8.5-10.1) 


 





 


Phosphorus Level


 


 3.2 mg/dL


(2.6-4.7) 


 





 


Magnesium Level


 


 2.1 mg/dL


(1.8-2.4) 


 





 


Troponin I Quantitative


 


 31.936 ng/mL


(0.000-0.055) 26.727 ng/mL


(0.000-0.055) 





 


Test


 8/14/20


03:00 


 


 





 


White Blood Count


 10.2 x10^3/uL


(4.0-11.0) 


 


 





 


Red Blood Count


 3.99 x10^6/uL


(3.50-5.40) 


 


 





 


Hemoglobin


 8.4 g/dL


(12.0-15.5) 


 


 





 


Hematocrit


 26.7 %


(36.0-47.0) 


 


 





 


Mean Corpuscular Volume 67 fL ()    


 


Mean Corpuscular Hemoglobin 21 pg (25-35)    


 


Mean Corpuscular Hemoglobin


Concent 31 g/dL


(31-37) 


 


 





 


Red Cell Distribution Width


 22.7 %


(11.5-14.5) 


 


 





 


Platelet Count


 327 x10^3/uL


(140-400) 


 


 





 


Neutrophils (%) (Auto) 75 % (31-73)    


 


Lymphocytes (%) (Auto) 16 % (24-48)    


 


Monocytes (%) (Auto) 6 % (0-9)    


 


Eosinophils (%) (Auto) 2 % (0-3)    


 


Basophils (%) (Auto) 1 % (0-3)    


 


Neutrophils # (Auto)


 7.6 x10^3/uL


(1.8-7.7) 


 


 





 


Lymphocytes # (Auto)


 1.7 x10^3/uL


(1.0-4.8) 


 


 





 


Monocytes # (Auto)


 0.6 x10^3/uL


(0.0-1.1) 


 


 





 


Eosinophils # (Auto)


 0.2 x10^3/uL


(0.0-0.7) 


 


 





 


Basophils # (Auto)


 0.1 x10^3/uL


(0.0-0.2) 


 


 





 


Heparin Anti-Xa Act,


Unfractionated 0.70 IU/mL


(0.30-0.70) 


 


 





 


Sodium Level


 136 mmol/L


(136-145) 


 


 





 


Potassium Level


 3.5 mmol/L


(3.5-5.1) 


 


 





 


Chloride Level


 103 mmol/L


() 


 


 





 


Carbon Dioxide Level


 27 mmol/L


(21-32) 


 


 





 


Anion Gap 6 (6-14)    


 


Blood Urea Nitrogen


 23 mg/dL


(7-20) 


 


 





 


Creatinine


 1.1 mg/dL


(0.6-1.0) 


 


 





 


Estimated GFR


(Cockcroft-Gault) 49.1 


 


 


 





 


Glucose Level


 134 mg/dL


(70-99) 


 


 





 


Calcium Level


 8.1 mg/dL


(8.5-10.1) 


 


 











Laboratory Tests








Test


 8/13/20


11:42 8/13/20


21:00 8/14/20


03:00


 


Troponin I Quantitative


 26.727 ng/mL


(0.000-0.055) 


 





 


Heparin Anti-Xa Act,


Unfractionated 


 0.52 IU/mL


(0.30-0.70) 0.70 IU/mL


(0.30-0.70)


 


White Blood Count


 


 


 10.2 x10^3/uL


(4.0-11.0)


 


Red Blood Count


 


 


 3.99 x10^6/uL


(3.50-5.40)


 


Hemoglobin


 


 


 8.4 g/dL


(12.0-15.5)


 


Hematocrit


 


 


 26.7 %


(36.0-47.0)


 


Mean Corpuscular Volume   67 fL () 


 


Mean Corpuscular Hemoglobin   21 pg (25-35) 


 


Mean Corpuscular Hemoglobin


Concent 


 


 31 g/dL


(31-37)


 


Red Cell Distribution Width


 


 


 22.7 %


(11.5-14.5)


 


Platelet Count


 


 


 327 x10^3/uL


(140-400)


 


Neutrophils (%) (Auto)   75 % (31-73) 


 


Lymphocytes (%) (Auto)   16 % (24-48) 


 


Monocytes (%) (Auto)   6 % (0-9) 


 


Eosinophils (%) (Auto)   2 % (0-3) 


 


Basophils (%) (Auto)   1 % (0-3) 


 


Neutrophils # (Auto)


 


 


 7.6 x10^3/uL


(1.8-7.7)


 


Lymphocytes # (Auto)


 


 


 1.7 x10^3/uL


(1.0-4.8)


 


Monocytes # (Auto)


 


 


 0.6 x10^3/uL


(0.0-1.1)


 


Eosinophils # (Auto)


 


 


 0.2 x10^3/uL


(0.0-0.7)


 


Basophils # (Auto)


 


 


 0.1 x10^3/uL


(0.0-0.2)


 


Sodium Level


 


 


 136 mmol/L


(136-145)


 


Potassium Level


 


 


 3.5 mmol/L


(3.5-5.1)


 


Chloride Level


 


 


 103 mmol/L


()


 


Carbon Dioxide Level


 


 


 27 mmol/L


(21-32)


 


Anion Gap   6 (6-14) 


 


Blood Urea Nitrogen


 


 


 23 mg/dL


(7-20)


 


Creatinine


 


 


 1.1 mg/dL


(0.6-1.0)


 


Estimated GFR


(Cockcroft-Gault) 


 


 49.1 





 


Glucose Level


 


 


 134 mg/dL


(70-99)


 


Calcium Level


 


 


 8.1 mg/dL


(8.5-10.1)








Medications





Active Scripts








 Medications  Dose


 Route/Sig


 Max Daily Dose Days Date Category


 


 Cetirizine Hcl 10


 Mg Tablet  1 Tab


 PO DAILY


   8/13/20 Reported


 


 Ditropan Xl


  (Oxybutynin


 Chloride) 10 Mg


 Tab.er.24  10 Mg


 PO DAILY


   9/14/16 Reported


 


 Zoloft


  (Sertraline Hcl)


 100 Mg Tablet  100 Mg


 PO DAILY


   9/14/16 Reported


 


 Ventolin Hfa


 Inhaler


  (Albuterol


 Sulfate) 18 Gm


 Hfa.aer.ad  2 Puff


 INH QID


   9/14/16 Reported


 


 Advair 100-50


 Diskus


  (Fluticasone/Salmeterol)


 1 Each Disk.w.dev  1 Inh


 IH BID


   9/14/16 Reported


 


 Xanax


  (Alprazolam) 0.25


 Mg Tablet  0.25 Mg


 PO PRN Q6HRS PRN


   9/14/16 Reported


 


 Cyclobenzaprine


 Hcl 10 Mg Tablet  10 Mg


 PO TID


   9/14/16 Reported


 


 Levothyroxine


 Sodium 125 Mcg


 Tablet  125 Mcg


 PO DAILYAC


   9/14/16 Reported


 


 Ranitidine Hcl


 300 Mg Capsule  300 Mg


 PO DAILY


   9/14/16 Reported


 


 Tramadol Hcl 50


 Mg Tablet  50 Mg


 PO DAILY PRN


   9/14/16 Reported


 


 Bystolic


  (Nebivolol) 5 Mg


 Tablet  5 Mg


 PO DAILY


   9/14/16 Reported








Comments


BLE U/S 


IMPRESSION:


*   Deep vein thrombosis seen on the left.





ECHIO 8/13/2020 


<Conclusion>


The left ventricular systolic function is normal and the ejection fraction is 

within normal range. The Ejection Fraction is 50%.


There is normal LV segmental wall motion.


The right ventricle is moderately to severly dilated measuring 5.2 cm.


Doppler and Color Flow revealed mild to moderate tricuspid regurgitation with an

estimated PAP of 71 mmHg.





Impression


.


IMPRESSION:


1.  Acute hypoxic respiratory failure secondary to acute massive pulmonary 

embolism with hemodynamic stability .  Risk factor is likely related to recent 

immobility for


the last 2 weeks due to her lumbar radiculopathy and also from the fall when she


twisted her knee.  Underlying obesity would be another risk factor.  She has no


known cancers.  Not on any form of estrogens.-- s/p systemic TPA with sig 

improvement, off BIPAP


2.  Atrial fibrillation with rapid ventricular response, likely caused by right


ventricular strain., on cardizem


3.  No significant tobacco history.


4.  No history of any hypercoagulable state.


5. LLE DVT 


6. Markedly elevated troponin (peak 31). Out of proportion to usually seen in RV

infarction. Will need cath Monday





Plan


.


RECOMMENDATIONS:


Continue supplemental oxygen to keep sats above 92 %, now on N/C off BIPAP, 


S/P systemic TPA with sig improvement of hypoxia .On Heparin drip.


Repeat CTA chest in am to see resolution.


Follow cardiology recommendations-- continued management of AFIB- now rate 

controlled 


wean off cardizem


ECHO revied EF 50% 


BLE DPLX positive for LLE DVT 


NEBS


 A/C -- heparin gtt . Can start Eliquis


hypercoagulable panel as an outpatient.


Cath Monday.


3-6 months of AC


DVT/GI PPX 





D/W RN and RT











UMU FRAZIER MD                 Aug 14, 2020 08:35

## 2020-08-14 NOTE — NUR
SS following up with discharge planning. SS reviewed pt chart and discussed with pt RN. Pt 
is currently off the BIPAP. Pt on nasal canula oxygen. COVID19 negative. Pt on PO Cardizem 
and Heparin drip. SS will continue to follow for discharge planning.

## 2020-08-14 NOTE — PDOC
[facility] PROGRESS NOTE


Date of Service


DOS:


DATE: 8/14/20 


TIME: 11:07





Chief Complaint


Chief Complaint


Acute hypoxic respiratory failure with worsening hypoxia secondary to saddle 

pulmonary embolism


Markedly increased troponin level secondary to severe right ventricular 

infarction and ischemia


Atrial fibrillation with rapid ventricular response induced by PE  


Hypertension


Hyperlipidemia 


Lumbar radiculopathy with significant decreased mobility with recent 

nontraumatic fall


Hypothyroidism


Microcytic anemia





History of Present Illness


History of Present Illness


08/14/2020


Patient seen and examined in ICU 


Patient is off BiPAP and down to 5L O2 per NC  


She received tPA yesterday, now on heparin drip 


COVID negative 


Discussed with RN 


Chart reviewed 








08/13/20


Patient seen and examined in the ICU 


Hypoxia is worsening and she is requiring 100% oxygen on BiPAP


Troponin increased to 31, she has begun tPA per Dr. Patel 


Echo this AM showed normal LV wall motion, EF 50%, RV dilation secondary to 

tricuspid regurgitation 


Awaiting COVID results


Discussed with RN 


Chart reviewed





Vitals/I&O


Vitals/I&O:





                                   Vital Signs








  Date Time  Temp Pulse Resp B/P (MAP) Pulse Ox O2 Delivery O2 Flow Rate FiO2


 


8/14/20 10:00  98 28 115/72 (86) 99 Nasal Cannula 5.0 


 


8/14/20 08:00 98.4       





 98.4       














                                    I & O   


 


 8/13/20 8/13/20 8/14/20





 15:00 23:00 07:00


 


Intake Total 140 ml 472 ml 438 ml


 


Output Total 300 ml 285 ml 175 ml


 


Balance -160 ml 187 ml 263 ml











Physical Exam


General:  Alert, Oriented X3, Cooperative, No acute distress


Heart:  Regular rate, Other (AFIB RVR)


Lungs:  Clear


Abdomen:  Normal bowel sounds, Soft, No tenderness


Extremities:  No clubbing, No cyanosis


Skin:  No rashes, No breakdown, No significant lesion





Labs


Labs:





Laboratory Tests








Test


 8/13/20


11:42 8/13/20


21:00 8/14/20


03:00


 


Troponin I Quantitative


 26.727 ng/mL


(0.000-0.055) 


 11.154 ng/mL


(0.000-0.055)


 


Heparin Anti-Xa Act,


Unfractionated 


 0.52 IU/mL


(0.30-0.70) 0.70 IU/mL


(0.30-0.70)


 


White Blood Count


 


 


 10.2 x10^3/uL


(4.0-11.0)


 


Red Blood Count


 


 


 3.99 x10^6/uL


(3.50-5.40)


 


Hemoglobin


 


 


 8.4 g/dL


(12.0-15.5)


 


Hematocrit


 


 


 26.7 %


(36.0-47.0)


 


Mean Corpuscular Volume   67 fL () 


 


Mean Corpuscular Hemoglobin   21 pg (25-35) 


 


Mean Corpuscular Hemoglobin


Concent 


 


 31 g/dL


(31-37)


 


Red Cell Distribution Width


 


 


 22.7 %


(11.5-14.5)


 


Platelet Count


 


 


 327 x10^3/uL


(140-400)


 


Neutrophils (%) (Auto)   75 % (31-73) 


 


Lymphocytes (%) (Auto)   16 % (24-48) 


 


Monocytes (%) (Auto)   6 % (0-9) 


 


Eosinophils (%) (Auto)   2 % (0-3) 


 


Basophils (%) (Auto)   1 % (0-3) 


 


Neutrophils # (Auto)


 


 


 7.6 x10^3/uL


(1.8-7.7)


 


Lymphocytes # (Auto)


 


 


 1.7 x10^3/uL


(1.0-4.8)


 


Monocytes # (Auto)


 


 


 0.6 x10^3/uL


(0.0-1.1)


 


Eosinophils # (Auto)


 


 


 0.2 x10^3/uL


(0.0-0.7)


 


Basophils # (Auto)


 


 


 0.1 x10^3/uL


(0.0-0.2)


 


Sodium Level


 


 


 136 mmol/L


(136-145)


 


Potassium Level


 


 


 3.5 mmol/L


(3.5-5.1)


 


Chloride Level


 


 


 103 mmol/L


()


 


Carbon Dioxide Level


 


 


 27 mmol/L


(21-32)


 


Anion Gap   6 (6-14) 


 


Blood Urea Nitrogen


 


 


 23 mg/dL


(7-20)


 


Creatinine


 


 


 1.1 mg/dL


(0.6-1.0)


 


Estimated GFR


(Cockcroft-Gault) 


 


 49.1 





 


Glucose Level


 


 


 134 mg/dL


(70-99)


 


Calcium Level


 


 


 8.1 mg/dL


(8.5-10.1)











Assessment and Plan


Assessmemt and Plan


Problems


Medical Problems:


(1) Atrial fibrillation with RVR


Status: Acute  





(2) Chest pain


Status: Acute  





(3) Saddle embolism of pulmonary artery


Status: Acute  





(4) Suspected COVID-19 virus infection


Status: Acute 





ASSESSMENT 


Acute hypoxic respiratory failure with worsening hypoxia secondary to saddle 

pulmonary embolism


Markedly increased troponin level secondary to severe right ventricular 

infarction and ischemia


Atrial fibrillation with rapid ventricular response induced by PE  


Hypertension


Hyperlipidemia 


Lumbar radiculopathy with significant decreased mobility with recent 

nontraumatic fall


Hypothyroidism


Microcytic anemia











PLAN 


Continue ICU monitoring 


Heparin drip 


Oxygen supplementation


Monitor hemoglobin 


Monitor ABGs 


Home meds 


Full code  


Appreciate cardiology and pulmonology input 








Comment


Review of Relevant


I have reviewed the following items donald (where applicable) has been applied.


Medications:





Current Medications








 Medications


  (Trade)  Dose


 Ordered  Sig/Mary


 Route


 PRN Reason  Start Time


 Stop Time Status Last Admin


Dose Admin


 


 Famotidine


  (Pepcid)  20 mg  PRN BID  PRN


 PO


 HEARTBURN / GAS  8/13/20 23:15


    8/14/20 09:36














Justicifation of Admission Dx:


Justifications for Admission:


Justification of Admission Dx:  Yes











ARUN GARDINER III DO           Aug 14, 2020 11:12

## 2020-08-15 VITALS — SYSTOLIC BLOOD PRESSURE: 127 MMHG | DIASTOLIC BLOOD PRESSURE: 67 MMHG

## 2020-08-15 VITALS — SYSTOLIC BLOOD PRESSURE: 129 MMHG | DIASTOLIC BLOOD PRESSURE: 90 MMHG

## 2020-08-15 VITALS — DIASTOLIC BLOOD PRESSURE: 79 MMHG | SYSTOLIC BLOOD PRESSURE: 153 MMHG

## 2020-08-15 VITALS — SYSTOLIC BLOOD PRESSURE: 134 MMHG | DIASTOLIC BLOOD PRESSURE: 74 MMHG

## 2020-08-15 VITALS — SYSTOLIC BLOOD PRESSURE: 129 MMHG | DIASTOLIC BLOOD PRESSURE: 70 MMHG

## 2020-08-15 LAB
ANION GAP SERPL CALC-SCNC: 8 MMOL/L (ref 6–14)
BASOPHILS # BLD AUTO: 0.1 X10^3/UL (ref 0–0.2)
BASOPHILS NFR BLD: 1 % (ref 0–3)
BUN SERPL-MCNC: 13 MG/DL (ref 7–20)
CALCIUM SERPL-MCNC: 8.3 MG/DL (ref 8.5–10.1)
CHLORIDE SERPL-SCNC: 104 MMOL/L (ref 98–107)
CO2 SERPL-SCNC: 27 MMOL/L (ref 21–32)
CREAT SERPL-MCNC: 0.8 MG/DL (ref 0.6–1)
EOSINOPHIL NFR BLD: 0.3 X10^3/UL (ref 0–0.7)
EOSINOPHIL NFR BLD: 4 % (ref 0–3)
ERYTHROCYTE [DISTWIDTH] IN BLOOD BY AUTOMATED COUNT: 22.5 % (ref 11.5–14.5)
GFR SERPLBLD BASED ON 1.73 SQ M-ARVRAT: 70.9 ML/MIN
GLUCOSE SERPL-MCNC: 116 MG/DL (ref 70–99)
HCT VFR BLD CALC: 24.9 % (ref 36–47)
HGB BLD-MCNC: 7.9 G/DL (ref 12–15.5)
LYMPHOCYTES # BLD: 1.4 X10^3/UL (ref 1–4.8)
LYMPHOCYTES NFR BLD AUTO: 16 % (ref 24–48)
MCH RBC QN AUTO: 21 PG (ref 25–35)
MCHC RBC AUTO-ENTMCNC: 32 G/DL (ref 31–37)
MCV RBC AUTO: 67 FL (ref 79–100)
MONO #: 0.6 X10^3/UL (ref 0–1.1)
MONOCYTES NFR BLD: 7 % (ref 0–9)
NEUT #: 6.2 X10^3/UL (ref 1.8–7.7)
NEUTROPHILS NFR BLD AUTO: 72 % (ref 31–73)
PLATELET # BLD AUTO: 330 X10^3/UL (ref 140–400)
POTASSIUM SERPL-SCNC: 3.6 MMOL/L (ref 3.5–5.1)
RBC # BLD AUTO: 3.7 X10^6/UL (ref 3.5–5.4)
SODIUM SERPL-SCNC: 139 MMOL/L (ref 136–145)
WBC # BLD AUTO: 8.6 X10^3/UL (ref 4–11)

## 2020-08-15 RX ADMIN — CEFTRIAXONE SCH GM: 1 INJECTION, POWDER, FOR SOLUTION INTRAMUSCULAR; INTRAVENOUS at 14:25

## 2020-08-15 RX ADMIN — CYCLOBENZAPRINE HYDROCHLORIDE SCH MG: 10 TABLET, FILM COATED ORAL at 14:25

## 2020-08-15 RX ADMIN — HEPARIN SODIUM PRN MLS/HR: 10000 INJECTION, SOLUTION INTRAVENOUS at 20:37

## 2020-08-15 RX ADMIN — LEVOTHYROXINE SODIUM SCH MCG: 125 TABLET ORAL at 04:19

## 2020-08-15 RX ADMIN — HEPARIN SODIUM PRN MLS/HR: 10000 INJECTION, SOLUTION INTRAVENOUS at 04:26

## 2020-08-15 RX ADMIN — CYCLOBENZAPRINE HYDROCHLORIDE SCH MG: 10 TABLET, FILM COATED ORAL at 20:33

## 2020-08-15 RX ADMIN — FAMOTIDINE PRN MG: 20 TABLET ORAL at 04:19

## 2020-08-15 RX ADMIN — IPRATROPIUM BROMIDE AND ALBUTEROL SCH PUFF: 20; 100 SPRAY, METERED RESPIRATORY (INHALATION) at 20:33

## 2020-08-15 RX ADMIN — Medication PRN EACH: at 09:58

## 2020-08-15 RX ADMIN — BUDESONIDE SCH MG: 0.5 INHALANT RESPIRATORY (INHALATION) at 18:52

## 2020-08-15 RX ADMIN — CYCLOBENZAPRINE HYDROCHLORIDE SCH MG: 10 TABLET, FILM COATED ORAL at 09:05

## 2020-08-15 RX ADMIN — BUDESONIDE SCH MG: 0.5 INHALANT RESPIRATORY (INHALATION) at 07:20

## 2020-08-15 RX ADMIN — FAMOTIDINE SCH MG: 20 TABLET ORAL at 20:33

## 2020-08-15 RX ADMIN — FAMOTIDINE SCH MG: 20 TABLET ORAL at 09:05

## 2020-08-15 NOTE — RAD
CT angiogram of the chest with contrast:

 

Reason for examination: PE. Follow-up after TPA.

 

Comparison is made to previous study dated 8/12/2020.

 

Helical images were obtained through the chest with intravenous 

administration 100 cc Omnipaque 350 using PE protocol. 3-D MIPS 

reconstruction was performed in sagittal and coronal planes.

 

Exposure: One or more of the following individualized dose reduction 

techniques were utilized for this examination:  1. Automated exposure 

control  2. Adjustment of the mA and/or kV according to patient size  3. 

Use of iterative reconstruction technique.

 

The trachea and mainstem bronchi show no intraluminal lesions. No 

abnormality seen at the esophagus. There is however moderate size hiatal 

hernia again seen. The thoracic aorta shows no aneurysmal dilatation or 

dissection. The heart size is normal with no pericardial effusion. There 

continue to be bilateral pulmonary emboli. The saddle embolus has resolved

but smaller emboli are still present in multiple pulmonary arteries 

bilaterally. Lung fields however show presence of patchy groundglass 

infiltrates bilaterally especially in the left upper lobe. These probably 

reflect areas of atelectasis but recommend clinical correlation and 

follow-up. No pleural effusions or pneumothorax are seen.

 

No abnormalities of seen at the visualized portions of the liver, spleen, 

adrenal glands or pancreas. There is a chronic nonhealed rib fracture 

laterally in right eighth rib which is unchanged. Degenerative changes are

again seen in the spine.

 

IMPRESSION:

 

Continued presence of multiple smaller bilateral pulmonary emboli but 

resolution of the saddle embolus seen previously. New patchy areas of 

groundglass infiltrates bilaterally especially peripherally in the left 

upper lobe. These may reflect areas of atelectasis but recommend clinical 

correlation and follow-up.

 

Electronically signed by: Ruthann Hurst MD (8/15/2020 11:17 AM) [facility]

## 2020-08-15 NOTE — PDOC
[facility] PROGRESS NOTE


Date of Service


DOS:


DATE: 8/15/20 


TIME: 12:14





Chief Complaint


Chief Complaint


Acute hypoxic respiratory failure with worsening hypoxia secondary to saddle 

pulmonary embolism


Markedly increased troponin level secondary to severe right ventricular 

infarction and ischemia


Atrial fibrillation with rapid ventricular response induced by PE  


Hypertension


Hyperlipidemia 


Lumbar radiculopathy with significant decreased mobility with recent 

nontraumatic fall


Hypothyroidism


Microcytic anemia





History of Present Illness


History of Present Illness


8/15/2020


Patient seen and examined


She just completed another repeat angiography of her chest


I reviewed the films the saddle emboli seems to have resolved quite a bit by my 

eye (awaiting official report from radiologist)


Discussed with RN


Chart reviewed











08/14/2020


Patient seen and examined in ICU 


Patient is off BiPAP and down to 5L O2 per NC  


She received tPA yesterday, now on heparin drip 


COVID negative 


Discussed with RN 


Chart reviewed 








08/13/20


Patient seen and examined in the ICU 


Hypoxia is worsening and she is requiring 100% oxygen on BiPAP


Troponin increased to 31, she has begun tPA per Dr. Patel 


Echo this AM showed normal LV wall motion, EF 50%, RV dilation secondary to 

tricuspid regurgitation 


Awaiting COVID results


Discussed with RN 


Chart reviewed





Vitals/I&O


Vitals/I&O:





                                   Vital Signs








  Date Time  Temp Pulse Resp B/P (MAP) Pulse Ox O2 Delivery O2 Flow Rate FiO2


 


8/15/20 11:12 98.1 111 22 127/67 (87) 100 Nasal Cannula 3.5 





 98.1       














                                    I & O   


 


 8/14/20 8/14/20 8/15/20





 15:00 23:00 07:00


 


Intake Total 75 ml 982 ml 366.8 ml


 


Output Total 210 ml 405 ml 


 


Balance -135 ml 577 ml 366.8 ml











Physical Exam


General:  Alert, Oriented X3, Cooperative, No acute distress


Heart:  Regular rate, Other (AFIB RVR)


Lungs:  Clear


Abdomen:  Normal bowel sounds, Soft, No tenderness


Extremities:  No clubbing, No cyanosis


Skin:  No rashes, No breakdown, No significant lesion





Labs


Labs:





Laboratory Tests








Test


 8/15/20


04:30


 


White Blood Count


 8.6 x10^3/uL


(4.0-11.0)


 


Red Blood Count


 3.70 x10^6/uL


(3.50-5.40)


 


Hemoglobin


 7.9 g/dL


(12.0-15.5)


 


Hematocrit


 24.9 %


(36.0-47.0)


 


Mean Corpuscular Volume 67 fL () 


 


Mean Corpuscular Hemoglobin 21 pg (25-35) 


 


Mean Corpuscular Hemoglobin


Concent 32 g/dL


(31-37)


 


Red Cell Distribution Width


 22.5 %


(11.5-14.5)


 


Platelet Count


 330 x10^3/uL


(140-400)


 


Neutrophils (%) (Auto) 72 % (31-73) 


 


Lymphocytes (%) (Auto) 16 % (24-48) 


 


Monocytes (%) (Auto) 7 % (0-9) 


 


Eosinophils (%) (Auto) 4 % (0-3) 


 


Basophils (%) (Auto) 1 % (0-3) 


 


Neutrophils # (Auto)


 6.2 x10^3/uL


(1.8-7.7)


 


Lymphocytes # (Auto)


 1.4 x10^3/uL


(1.0-4.8)


 


Monocytes # (Auto)


 0.6 x10^3/uL


(0.0-1.1)


 


Eosinophils # (Auto)


 0.3 x10^3/uL


(0.0-0.7)


 


Basophils # (Auto)


 0.1 x10^3/uL


(0.0-0.2)


 


Heparin Anti-Xa Act,


Unfractionated 0.69 IU/mL


(0.30-0.70)


 


Sodium Level


 139 mmol/L


(136-145)


 


Potassium Level


 3.6 mmol/L


(3.5-5.1)


 


Chloride Level


 104 mmol/L


()


 


Carbon Dioxide Level


 27 mmol/L


(21-32)


 


Anion Gap 8 (6-14) 


 


Blood Urea Nitrogen


 13 mg/dL


(7-20)


 


Creatinine


 0.8 mg/dL


(0.6-1.0)


 


Estimated GFR


(Cockcroft-Gault) 70.9 





 


Glucose Level


 116 mg/dL


(70-99)


 


Calcium Level


 8.3 mg/dL


(8.5-10.1)











Assessment and Plan


Assessmemt and Plan


Problems


Medical Problems:


(1) Atrial fibrillation with RVR


Status: Acute  





(2) Chest pain


Status: Acute  





(3) Saddle embolism of pulmonary artery


Status: Acute  





(4) Suspected COVID-19 virus infection


Status: Acute  





Acute hypoxic respiratory failure with worsening hypoxia secondary to saddle 

pulmonary embolism


Markedly increased troponin level secondary to severe right ventricular 

infarction and ischemia


Atrial fibrillation with rapid ventricular response induced by PE  


Hypertension


Hyperlipidemia 


Lumbar radiculopathy with significant decreased mobility with recent 

nontraumatic fall


Hypothyroidism


Microcytic anemia











PLAN 


Cardiac monitoring


Heparin drip hope to change over to p.o. anticoagulation soon


Oxygen supplementation


Monitor hemoglobin 


Monitor ABGs 


Home meds 


Full code  


Appreciate cardiology and pulmonology input





Comment


Review of Relevant


I have reviewed the following items donald (where applicable) has been applied.


Medications:





Current Medications








 Medications


  (Trade)  Dose


 Ordered  Sig/Mary


 Route


 PRN Reason  Start Time


 Stop Time Status Last Admin


Dose Admin


 


 Famotidine


  (Pepcid)  20 mg  BID


 PO


   8/15/20 09:00


    8/15/20 09:05





 


 Iohexol


  (Omnipaque 350


 Mg/ml)  100 ml  1X  ONCE


 IV


   8/15/20 08:00


 8/15/20 08:01 DC 8/15/20 10:30














Justicifation of Admission Dx:


Justifications for Admission:


Justification of Admission Dx:  Yes











ARUN GARDINER III DO           Aug 15, 2020 12:15

## 2020-08-15 NOTE — PDOC
[facility] PROGRESS NOTE


Date of Service


DOS:


DATE: 8/15/20 


TIME: 12:06





Chief Complaint


Chief Complaint


Acute hypoxic respiratory failure with worsening hypoxia secondary to saddle 

pulmonary embolism


Markedly increased troponin level secondary to severe right ventricular 

infarction and ischemia


Atrial fibrillation with rapid ventricular response induced by PE  


Hypertension


Hyperlipidemia 


Lumbar radiculopathy with significant decreased mobility with recent 

nontraumatic fall


Hypothyroidism


Microcytic anemia





History of Present Illness


History of Present Illness


8/15/2020


Patient seen and examined in CT lab


CT angiogram performed on patient this morning


Resting comfortably on O2 per NC


Discussed with RN


Charts reviewed





08/14/2020


Patient seen and examined in ICU 


Patient is off BiPAP and down to 5L O2 per NC  


She received tPA yesterday, now on heparin drip 


COVID negative 


Discussed with RN 


Chart reviewed 








08/13/20


Patient seen and examined in the ICU 


Hypoxia is worsening and she is requiring 100% oxygen on BiPAP


Troponin increased to 31, she has begun tPA per Dr. Patel 


Echo this AM showed normal LV wall motion, EF 50%, RV dilation secondary to 

tricuspid regurgitation 


Awaiting COVID results


Discussed with RN 


Chart reviewed





Vitals/I&O


Vitals/I&O:





                                   Vital Signs








  Date Time  Temp Pulse Resp B/P (MAP) Pulse Ox O2 Delivery O2 Flow Rate FiO2


 


8/15/20 11:12 98.1 111 22 127/67 (87) 100 Nasal Cannula 3.5 





 98.1       














                                    I & O   


 


 8/14/20 8/14/20 8/15/20





 15:00 23:00 07:00


 


Intake Total 75 ml 982 ml 366.8 ml


 


Output Total 210 ml 405 ml 


 


Balance -135 ml 577 ml 366.8 ml











Physical Exam


General:  Alert, Oriented X3, Cooperative, No acute distress


Heart:  Regular rate, Other (AFIB RVR)


Lungs:  Clear


Abdomen:  Normal bowel sounds, Soft, No tenderness


Extremities:  No clubbing, No cyanosis


Skin:  No rashes, No breakdown, No significant lesion





Labs


Labs:





Laboratory Tests








Test


 8/15/20


04:30


 


White Blood Count


 8.6 x10^3/uL


(4.0-11.0)


 


Red Blood Count


 3.70 x10^6/uL


(3.50-5.40)


 


Hemoglobin


 7.9 g/dL


(12.0-15.5)


 


Hematocrit


 24.9 %


(36.0-47.0)


 


Mean Corpuscular Volume 67 fL () 


 


Mean Corpuscular Hemoglobin 21 pg (25-35) 


 


Mean Corpuscular Hemoglobin


Concent 32 g/dL


(31-37)


 


Red Cell Distribution Width


 22.5 %


(11.5-14.5)


 


Platelet Count


 330 x10^3/uL


(140-400)


 


Neutrophils (%) (Auto) 72 % (31-73) 


 


Lymphocytes (%) (Auto) 16 % (24-48) 


 


Monocytes (%) (Auto) 7 % (0-9) 


 


Eosinophils (%) (Auto) 4 % (0-3) 


 


Basophils (%) (Auto) 1 % (0-3) 


 


Neutrophils # (Auto)


 6.2 x10^3/uL


(1.8-7.7)


 


Lymphocytes # (Auto)


 1.4 x10^3/uL


(1.0-4.8)


 


Monocytes # (Auto)


 0.6 x10^3/uL


(0.0-1.1)


 


Eosinophils # (Auto)


 0.3 x10^3/uL


(0.0-0.7)


 


Basophils # (Auto)


 0.1 x10^3/uL


(0.0-0.2)


 


Heparin Anti-Xa Act,


Unfractionated 0.69 IU/mL


(0.30-0.70)


 


Sodium Level


 139 mmol/L


(136-145)


 


Potassium Level


 3.6 mmol/L


(3.5-5.1)


 


Chloride Level


 104 mmol/L


()


 


Carbon Dioxide Level


 27 mmol/L


(21-32)


 


Anion Gap 8 (6-14) 


 


Blood Urea Nitrogen


 13 mg/dL


(7-20)


 


Creatinine


 0.8 mg/dL


(0.6-1.0)


 


Estimated GFR


(Cockcroft-Gault) 70.9 





 


Glucose Level


 116 mg/dL


(70-99)


 


Calcium Level


 8.3 mg/dL


(8.5-10.1)











Assessment and Plan


Assessmemt and Plan


Problems


Medical Problems:


(1) Atrial fibrillation with RVR


Status: Acute  





(2) Chest pain


Status: Acute  





(3) Saddle embolism of pulmonary artery


Status: Acute  





(4) Suspected COVID-19 virus infection


Status: Acute  





Assessment


Acute hypoxic respiratory failure with worsening hypoxia secondary to saddle 

pulmonary embolism


Markedly increased troponin level secondary to severe right ventricular 

infarction and ischemia


Atrial fibrillation with rapid ventricular response induced by PE  


Hypertension


Hyperlipidemia 


Lumbar radiculopathy with significant decreased mobility with recent nontra

umatic fall


Hypothyroidism


Microcytic anemia 





Plan


Continue Heparin protocol


Await CT angiogram result


Oxygen supplementation


Cardiac monitoring 


Trend hemoglobin 


Monitor ABGs 


Home meds 


Full code  


Appreciate subspecialist input 








Comment


Review of Relevant


I have reviewed the following items donald (where applicable) has been applied.


Medications:





Current Medications








 Medications


  (Trade)  Dose


 Ordered  Sig/Mary


 Route


 PRN Reason  Start Time


 Stop Time Status Last Admin


Dose Admin


 


 Famotidine


  (Pepcid)  20 mg  BID


 PO


   8/15/20 09:00


    8/15/20 09:05





 


 Iohexol


  (Omnipaque 350


 Mg/ml)  100 ml  1X  ONCE


 IV


   8/15/20 08:00


 8/15/20 08:01 DC 8/15/20 10:30














Justicifation of Admission Dx:


Justifications for Admission:


Justification of Admission Dx:  Yes











ARUN GARDINER III DO           Aug 15, 2020 12:15

## 2020-08-15 NOTE — PDOC
PULMONARY PROGRESS NOTES


DATE: 8/15/20 


TIME: 05:27


Subjective


Pt. is S/P systemic TPA on 8/13/2020, now on N/C oxygen, 5 lpm


no SOB, or cough or CP, has stomach pain


Vitals





Vital Signs








  Date Time  Temp Pulse Resp B/P (MAP) Pulse Ox O2 Delivery O2 Flow Rate FiO2


 


8/15/20 03:23 97.8 81 23 129/70 (89) 99 Nasal Cannula 5.0 





 97.8       








ROS:  No Nausea, No Chest Pain, No Abdominal Pain, No Increase Cough


General:  Alert


Lungs:  Clear


Cardiovascular:  S1, S2


Abdomen:  Soft, Non-tender


Neuro Exam:  Alert


Extremities:  No Edema


Skin:  Warm


Labs





Laboratory Tests








Test


 8/13/20


06:15 8/13/20


11:42 8/13/20


21:00 8/14/20


03:00


 


White Blood Count


 10.6 x10^3/uL


(4.0-11.0) 


 


 10.2 x10^3/uL


(4.0-11.0)


 


Red Blood Count


 4.53 x10^6/uL


(3.50-5.40) 


 


 3.99 x10^6/uL


(3.50-5.40)


 


Hemoglobin


 9.5 g/dL


(12.0-15.5) 


 


 8.4 g/dL


(12.0-15.5)


 


Hematocrit


 30.9 %


(36.0-47.0) 


 


 26.7 %


(36.0-47.0)


 


Mean Corpuscular Volume 68 fL ()    67 fL () 


 


Mean Corpuscular Hemoglobin 21 pg (25-35)    21 pg (25-35) 


 


Mean Corpuscular Hemoglobin


Concent 31 g/dL


(31-37) 


 


 31 g/dL


(31-37)


 


Red Cell Distribution Width


 23.2 %


(11.5-14.5) 


 


 22.7 %


(11.5-14.5)


 


Platelet Count


 381 x10^3/uL


(140-400) 


 


 327 x10^3/uL


(140-400)


 


Heparin Anti-Xa Act,


Unfractionated 0.81 IU/mL


(0.30-0.70) 


 0.52 IU/mL


(0.30-0.70) 0.70 IU/mL


(0.30-0.70)


 


Sodium Level


 139 mmol/L


(136-145) 


 


 136 mmol/L


(136-145)


 


Potassium Level


 3.8 mmol/L


(3.5-5.1) 


 


 3.5 mmol/L


(3.5-5.1)


 


Chloride Level


 104 mmol/L


() 


 


 103 mmol/L


()


 


Carbon Dioxide Level


 24 mmol/L


(21-32) 


 


 27 mmol/L


(21-32)


 


Anion Gap 11 (6-14)    6 (6-14) 


 


Blood Urea Nitrogen


 21 mg/dL


(7-20) 


 


 23 mg/dL


(7-20)


 


Creatinine


 0.8 mg/dL


(0.6-1.0) 


 


 1.1 mg/dL


(0.6-1.0)


 


Estimated GFR


(Cockcroft-Gault) 70.9 


 


 


 49.1 





 


Glucose Level


 105 mg/dL


(70-99) 


 


 134 mg/dL


(70-99)


 


Calcium Level


 8.8 mg/dL


(8.5-10.1) 


 


 8.1 mg/dL


(8.5-10.1)


 


Phosphorus Level


 3.2 mg/dL


(2.6-4.7) 


 


 





 


Magnesium Level


 2.1 mg/dL


(1.8-2.4) 


 


 





 


Troponin I Quantitative


 31.936 ng/mL


(0.000-0.055) 26.727 ng/mL


(0.000-0.055) 


 11.154 ng/mL


(0.000-0.055)


 


Neutrophils (%) (Auto)    75 % (31-73) 


 


Lymphocytes (%) (Auto)    16 % (24-48) 


 


Monocytes (%) (Auto)    6 % (0-9) 


 


Eosinophils (%) (Auto)    2 % (0-3) 


 


Basophils (%) (Auto)    1 % (0-3) 


 


Neutrophils # (Auto)


 


 


 


 7.6 x10^3/uL


(1.8-7.7)


 


Lymphocytes # (Auto)


 


 


 


 1.7 x10^3/uL


(1.0-4.8)


 


Monocytes # (Auto)


 


 


 


 0.6 x10^3/uL


(0.0-1.1)


 


Eosinophils # (Auto)


 


 


 


 0.2 x10^3/uL


(0.0-0.7)


 


Basophils # (Auto)


 


 


 


 0.1 x10^3/uL


(0.0-0.2)








Medications





Active Scripts








 Medications  Dose


 Route/Sig


 Max Daily Dose Days Date Category


 


 Cetirizine Hcl 10


 Mg Tablet  1 Tab


 PO DAILY


   8/13/20 Reported


 


 Ditropan Xl


  (Oxybutynin


 Chloride) 10 Mg


 Tab.er.24  10 Mg


 PO DAILY


   9/14/16 Reported


 


 Zoloft


  (Sertraline Hcl)


 100 Mg Tablet  100 Mg


 PO DAILY


   9/14/16 Reported


 


 Ventolin Hfa


 Inhaler


  (Albuterol


 Sulfate) 18 Gm


 Hfa.aer.ad  2 Puff


 INH QID


   9/14/16 Reported


 


 Advair 100-50


 Diskus


  (Fluticasone/Salmeterol)


 1 Each Disk.w.dev  1 Inh


 IH BID


   9/14/16 Reported


 


 Xanax


  (Alprazolam) 0.25


 Mg Tablet  0.25 Mg


 PO PRN Q6HRS PRN


   9/14/16 Reported


 


 Cyclobenzaprine


 Hcl 10 Mg Tablet  10 Mg


 PO TID


   9/14/16 Reported


 


 Levothyroxine


 Sodium 125 Mcg


 Tablet  125 Mcg


 PO DAILYAC


   9/14/16 Reported


 


 Ranitidine Hcl


 300 Mg Capsule  300 Mg


 PO DAILY


   9/14/16 Reported


 


 Tramadol Hcl 50


 Mg Tablet  50 Mg


 PO DAILY PRN


   9/14/16 Reported


 


 Bystolic


  (Nebivolol) 5 Mg


 Tablet  5 Mg


 PO DAILY


   9/14/16 Reported








Comments


BLE U/S 


IMPRESSION:


*   Deep vein thrombosis seen on the left.





ECHIO 8/13/2020 


<Conclusion>


The left ventricular systolic function is normal and the ejection fraction is 

within normal range. The Ejection Fraction is 50%.


There is normal LV segmental wall motion.


The right ventricle is moderately to severly dilated measuring 5.2 cm.


Doppler and Color Flow revealed mild to moderate tricuspid regurgitation with an

estimated PAP of 71 mmHg.





Impression


.


IMPRESSION:


1.  Acute hypoxic respiratory failure secondary to acute massive pulmonary 

embolism with hemodynamic stability .  Risk factor is likely related to recent 

immobility for


the last 2 weeks due to her lumbar radiculopathy and also from the fall when she


twisted her knee.  Underlying obesity would be another risk factor.  She has no


known cancers.  Not on any form of estrogens.-- s/p systemic TPA with sig 

improvement, off BIPAP


2.  Atrial fibrillation with rapid ventricular response, likely caused by right


ventricular strain., on cardizem


3.  No significant tobacco history.


4.  No history of any hypercoagulable state.


5. LLE DVT 


6. Markedly elevated troponin (peak 31). Out of proportion to usually seen in RV

infarction. Will need cath Monday





Plan


.


RECOMMENDATIONS:


Continue supplemental oxygen to keep sats above 92 %, now on N/C off BIPAP, 


add pepcid bid for stress ulcer prophylaxis


S/P systemic TPA with sig improvement of hypoxia .On Heparin drip.


Repeat CTA chest, will review.


Follow cardiology recommendations-- continued management of AFIB- now rate 

controlled 


ECHO revied EF 50% 


BLE DPLX positive for LLE DVT 


NEBS


 A/C -- heparin gtt . Can start Eliquis


hypercoagulable panel as an outpatient.


Cath Monday.


3-6 months of AC








D/W KERWIN HOLM MD               Aug 15, 2020 05:29

## 2020-08-16 VITALS — DIASTOLIC BLOOD PRESSURE: 65 MMHG | SYSTOLIC BLOOD PRESSURE: 135 MMHG

## 2020-08-16 VITALS — SYSTOLIC BLOOD PRESSURE: 144 MMHG | DIASTOLIC BLOOD PRESSURE: 74 MMHG

## 2020-08-16 VITALS — DIASTOLIC BLOOD PRESSURE: 78 MMHG | SYSTOLIC BLOOD PRESSURE: 161 MMHG

## 2020-08-16 VITALS — SYSTOLIC BLOOD PRESSURE: 154 MMHG | DIASTOLIC BLOOD PRESSURE: 81 MMHG

## 2020-08-16 VITALS — DIASTOLIC BLOOD PRESSURE: 92 MMHG | SYSTOLIC BLOOD PRESSURE: 154 MMHG

## 2020-08-16 VITALS — DIASTOLIC BLOOD PRESSURE: 88 MMHG | SYSTOLIC BLOOD PRESSURE: 137 MMHG

## 2020-08-16 LAB
ANION GAP SERPL CALC-SCNC: 7 MMOL/L (ref 6–14)
BASOPHILS # BLD AUTO: 0.1 X10^3/UL (ref 0–0.2)
BASOPHILS NFR BLD: 1 % (ref 0–3)
BUN SERPL-MCNC: 9 MG/DL (ref 7–20)
CALCIUM SERPL-MCNC: 8.7 MG/DL (ref 8.5–10.1)
CHLORIDE SERPL-SCNC: 106 MMOL/L (ref 98–107)
CO2 SERPL-SCNC: 29 MMOL/L (ref 21–32)
CREAT SERPL-MCNC: 0.9 MG/DL (ref 0.6–1)
EOSINOPHIL NFR BLD: 0.4 X10^3/UL (ref 0–0.7)
EOSINOPHIL NFR BLD: 5 % (ref 0–3)
ERYTHROCYTE [DISTWIDTH] IN BLOOD BY AUTOMATED COUNT: 23 % (ref 11.5–14.5)
GFR SERPLBLD BASED ON 1.73 SQ M-ARVRAT: 61.9 ML/MIN
GLUCOSE SERPL-MCNC: 137 MG/DL (ref 70–99)
HCT VFR BLD CALC: 26.3 % (ref 36–47)
HGB BLD-MCNC: 8.2 G/DL (ref 12–15.5)
LYMPHOCYTES # BLD: 1.5 X10^3/UL (ref 1–4.8)
LYMPHOCYTES NFR BLD AUTO: 20 % (ref 24–48)
MCH RBC QN AUTO: 21 PG (ref 25–35)
MCHC RBC AUTO-ENTMCNC: 31 G/DL (ref 31–37)
MCV RBC AUTO: 68 FL (ref 79–100)
MONO #: 0.5 X10^3/UL (ref 0–1.1)
MONOCYTES NFR BLD: 7 % (ref 0–9)
NEUT #: 5.3 X10^3/UL (ref 1.8–7.7)
NEUTROPHILS NFR BLD AUTO: 68 % (ref 31–73)
PLATELET # BLD AUTO: 378 X10^3/UL (ref 140–400)
POTASSIUM SERPL-SCNC: 3.4 MMOL/L (ref 3.5–5.1)
RBC # BLD AUTO: 3.9 X10^6/UL (ref 3.5–5.4)
SODIUM SERPL-SCNC: 142 MMOL/L (ref 136–145)
WBC # BLD AUTO: 7.9 X10^3/UL (ref 4–11)

## 2020-08-16 RX ADMIN — CYCLOBENZAPRINE HYDROCHLORIDE SCH MG: 10 TABLET, FILM COATED ORAL at 21:14

## 2020-08-16 RX ADMIN — CEFTRIAXONE SCH GM: 1 INJECTION, POWDER, FOR SOLUTION INTRAMUSCULAR; INTRAVENOUS at 14:33

## 2020-08-16 RX ADMIN — BUDESONIDE SCH MG: 0.5 INHALANT RESPIRATORY (INHALATION) at 07:31

## 2020-08-16 RX ADMIN — IPRATROPIUM BROMIDE AND ALBUTEROL SCH PUFF: 20; 100 SPRAY, METERED RESPIRATORY (INHALATION) at 21:14

## 2020-08-16 RX ADMIN — FAMOTIDINE SCH MG: 20 TABLET ORAL at 21:14

## 2020-08-16 RX ADMIN — Medication SCH CAP: at 21:14

## 2020-08-16 RX ADMIN — CYCLOBENZAPRINE HYDROCHLORIDE SCH MG: 10 TABLET, FILM COATED ORAL at 08:45

## 2020-08-16 RX ADMIN — FAMOTIDINE SCH MG: 20 TABLET ORAL at 08:45

## 2020-08-16 RX ADMIN — IPRATROPIUM BROMIDE AND ALBUTEROL SCH PUFF: 20; 100 SPRAY, METERED RESPIRATORY (INHALATION) at 12:16

## 2020-08-16 RX ADMIN — IPRATROPIUM BROMIDE AND ALBUTEROL SCH PUFF: 20; 100 SPRAY, METERED RESPIRATORY (INHALATION) at 18:51

## 2020-08-16 RX ADMIN — Medication PRN EACH: at 12:09

## 2020-08-16 RX ADMIN — HEPARIN SODIUM PRN MLS/HR: 10000 INJECTION, SOLUTION INTRAVENOUS at 14:36

## 2020-08-16 RX ADMIN — CYCLOBENZAPRINE HYDROCHLORIDE SCH MG: 10 TABLET, FILM COATED ORAL at 14:36

## 2020-08-16 RX ADMIN — BUDESONIDE SCH MG: 0.5 INHALANT RESPIRATORY (INHALATION) at 17:39

## 2020-08-16 RX ADMIN — LEVOTHYROXINE SODIUM SCH MCG: 125 TABLET ORAL at 06:27

## 2020-08-16 RX ADMIN — IPRATROPIUM BROMIDE AND ALBUTEROL SCH PUFF: 20; 100 SPRAY, METERED RESPIRATORY (INHALATION) at 08:43

## 2020-08-16 NOTE — PDOC
PULMONARY PROGRESS NOTES


DATE: 8/16/20 


TIME: 12:37


Subjective


sob is better, cough sputum better w abx started yesterday, no cp


Pt. is S/P systemic TPA on 8/13/2020, now on N/C oxygen, 2 lpm


Vitals





Vital Signs








  Date Time  Temp Pulse Resp B/P (MAP) Pulse Ox O2 Delivery O2 Flow Rate FiO2


 


8/16/20 11:17 98.1 142 22 161/78 (105) 96 Nasal Cannula 2.0 





 98.1       








ROS:  No Nausea, No Chest Pain, No Abdominal Pain, No Increase Cough


General:  Alert


Lungs:  Clear


Cardiovascular:  S1, S2


Abdomen:  Soft, Non-tender


Neuro Exam:  Alert


Extremities:  No Edema


Skin:  Warm


Labs





Laboratory Tests








Test


 8/15/20


04:30 8/16/20


04:00


 


White Blood Count


 8.6 x10^3/uL


(4.0-11.0) 7.9 x10^3/uL


(4.0-11.0)


 


Red Blood Count


 3.70 x10^6/uL


(3.50-5.40) 3.90 x10^6/uL


(3.50-5.40)


 


Hemoglobin


 7.9 g/dL


(12.0-15.5) 8.2 g/dL


(12.0-15.5)


 


Hematocrit


 24.9 %


(36.0-47.0) 26.3 %


(36.0-47.0)


 


Mean Corpuscular Volume 67 fL ()  68 fL () 


 


Mean Corpuscular Hemoglobin 21 pg (25-35)  21 pg (25-35) 


 


Mean Corpuscular Hemoglobin


Concent 32 g/dL


(31-37) 31 g/dL


(31-37)


 


Red Cell Distribution Width


 22.5 %


(11.5-14.5) 23.0 %


(11.5-14.5)


 


Platelet Count


 330 x10^3/uL


(140-400) 378 x10^3/uL


(140-400)


 


Neutrophils (%) (Auto) 72 % (31-73)  68 % (31-73) 


 


Lymphocytes (%) (Auto) 16 % (24-48)  20 % (24-48) 


 


Monocytes (%) (Auto) 7 % (0-9)  7 % (0-9) 


 


Eosinophils (%) (Auto) 4 % (0-3)  5 % (0-3) 


 


Basophils (%) (Auto) 1 % (0-3)  1 % (0-3) 


 


Neutrophils # (Auto)


 6.2 x10^3/uL


(1.8-7.7) 5.3 x10^3/uL


(1.8-7.7)


 


Lymphocytes # (Auto)


 1.4 x10^3/uL


(1.0-4.8) 1.5 x10^3/uL


(1.0-4.8)


 


Monocytes # (Auto)


 0.6 x10^3/uL


(0.0-1.1) 0.5 x10^3/uL


(0.0-1.1)


 


Eosinophils # (Auto)


 0.3 x10^3/uL


(0.0-0.7) 0.4 x10^3/uL


(0.0-0.7)


 


Basophils # (Auto)


 0.1 x10^3/uL


(0.0-0.2) 0.1 x10^3/uL


(0.0-0.2)


 


Heparin Anti-Xa Act,


Unfractionated 0.69 IU/mL


(0.30-0.70) 0.77 IU/mL


(0.30-0.70)


 


Sodium Level


 139 mmol/L


(136-145) 142 mmol/L


(136-145)


 


Potassium Level


 3.6 mmol/L


(3.5-5.1) 3.4 mmol/L


(3.5-5.1)


 


Chloride Level


 104 mmol/L


() 106 mmol/L


()


 


Carbon Dioxide Level


 27 mmol/L


(21-32) 29 mmol/L


(21-32)


 


Anion Gap 8 (6-14)  7 (6-14) 


 


Blood Urea Nitrogen


 13 mg/dL


(7-20) 9 mg/dL (7-20) 





 


Creatinine


 0.8 mg/dL


(0.6-1.0) 0.9 mg/dL


(0.6-1.0)


 


Estimated GFR


(Cockcroft-Gault) 70.9 


 61.9 





 


Glucose Level


 116 mg/dL


(70-99) 137 mg/dL


(70-99)


 


Calcium Level


 8.3 mg/dL


(8.5-10.1) 8.7 mg/dL


(8.5-10.1)








Laboratory Tests








Test


 8/16/20


04:00


 


White Blood Count


 7.9 x10^3/uL


(4.0-11.0)


 


Red Blood Count


 3.90 x10^6/uL


(3.50-5.40)


 


Hemoglobin


 8.2 g/dL


(12.0-15.5)


 


Hematocrit


 26.3 %


(36.0-47.0)


 


Mean Corpuscular Volume 68 fL () 


 


Mean Corpuscular Hemoglobin 21 pg (25-35) 


 


Mean Corpuscular Hemoglobin


Concent 31 g/dL


(31-37)


 


Red Cell Distribution Width


 23.0 %


(11.5-14.5)


 


Platelet Count


 378 x10^3/uL


(140-400)


 


Neutrophils (%) (Auto) 68 % (31-73) 


 


Lymphocytes (%) (Auto) 20 % (24-48) 


 


Monocytes (%) (Auto) 7 % (0-9) 


 


Eosinophils (%) (Auto) 5 % (0-3) 


 


Basophils (%) (Auto) 1 % (0-3) 


 


Neutrophils # (Auto)


 5.3 x10^3/uL


(1.8-7.7)


 


Lymphocytes # (Auto)


 1.5 x10^3/uL


(1.0-4.8)


 


Monocytes # (Auto)


 0.5 x10^3/uL


(0.0-1.1)


 


Eosinophils # (Auto)


 0.4 x10^3/uL


(0.0-0.7)


 


Basophils # (Auto)


 0.1 x10^3/uL


(0.0-0.2)


 


Heparin Anti-Xa Act,


Unfractionated 0.77 IU/mL


(0.30-0.70)


 


Sodium Level


 142 mmol/L


(136-145)


 


Potassium Level


 3.4 mmol/L


(3.5-5.1)


 


Chloride Level


 106 mmol/L


()


 


Carbon Dioxide Level


 29 mmol/L


(21-32)


 


Anion Gap 7 (6-14) 


 


Blood Urea Nitrogen 9 mg/dL (7-20) 


 


Creatinine


 0.9 mg/dL


(0.6-1.0)


 


Estimated GFR


(Cockcroft-Gault) 61.9 





 


Glucose Level


 137 mg/dL


(70-99)


 


Calcium Level


 8.7 mg/dL


(8.5-10.1)








Medications





Active Scripts








 Medications  Dose


 Route/Sig


 Max Daily Dose Days Date Category


 


 Cetirizine Hcl 10


 Mg Tablet  1 Tab


 PO DAILY


   8/13/20 Reported


 


 Ditropan Xl


  (Oxybutynin


 Chloride) 10 Mg


 Tab.er.24  10 Mg


 PO DAILY


   9/14/16 Reported


 


 Zoloft


  (Sertraline Hcl)


 100 Mg Tablet  100 Mg


 PO DAILY


   9/14/16 Reported


 


 Ventolin Hfa


 Inhaler


  (Albuterol


 Sulfate) 18 Gm


 Hfa.aer.ad  2 Puff


 INH QID


   9/14/16 Reported


 


 Advair 100-50


 Diskus


  (Fluticasone/Salmeterol)


 1 Each Disk.w.dev  1 Inh


 IH BID


   9/14/16 Reported


 


 Xanax


  (Alprazolam) 0.25


 Mg Tablet  0.25 Mg


 PO PRN Q6HRS PRN


   9/14/16 Reported


 


 Cyclobenzaprine


 Hcl 10 Mg Tablet  10 Mg


 PO TID


   9/14/16 Reported


 


 Levothyroxine


 Sodium 125 Mcg


 Tablet  125 Mcg


 PO DAILYAC


   9/14/16 Reported


 


 Ranitidine Hcl


 300 Mg Capsule  300 Mg


 PO DAILY


   9/14/16 Reported


 


 Tramadol Hcl 50


 Mg Tablet  50 Mg


 PO DAILY PRN


   9/14/16 Reported


 


 Bystolic


  (Nebivolol) 5 Mg


 Tablet  5 Mg


 PO DAILY


   9/14/16 Reported








Comments


cta 8/15/20 reviewed


Continued presence of multiple smaller bilateral pulmonary emboli but 


resolution of the saddle embolus seen previously. New patchy areas of 


groundglass infiltrates bilaterally especially peripherally in the left 


upper lobe. These may reflect areas of atelectasis but recommend clinical 


correlation and follow-up.





BLE U/S 


IMPRESSION:


*   Deep vein thrombosis seen on the left.





ECHIO 8/13/2020 


<Conclusion>


The left ventricular systolic function is normal and the ejection fraction is 

within normal range. The Ejection Fraction is 50%.


There is normal LV segmental wall motion.


The right ventricle is moderately to severly dilated measuring 5.2 cm.


Doppler and Color Flow revealed mild to moderate tricuspid regurgitation with an

estimated PAP of 71 mmHg.





Impression


.


IMPRESSION:


1.  Acute hypoxic respiratory failure secondary to acute massive pulmonary 

embolism with hemodynamic stability .  Risk factor is likely related to recent 

immobility for


the last 2 weeks due to her lumbar radiculopathy and also from the fall when she


twisted her knee.  Underlying obesity would be another risk factor.  She has no


known cancers.  Not on any form of estrogens.-- s/p systemic TPA with sig 

improvement, off BIPAP, oxygenation improving


2.  Atrial fibrillation with rapid ventricular response, likely caused by right


ventricular strain., on cardizem


3.  No significant tobacco history.


4.  No history of any hypercoagulable state.


5. LLE DVT 


6. Markedly elevated troponin (peak 31). Out of proportion to usually seen in RV

infarction. c per cardiology





Plan


.


RECOMMENDATIONS:


Continue supplemental oxygen to keep sats above 92 %, now on N/C 2 lpm


abx added for increased cough, yellow sputum and infilt


fu covid19 testing 


pepcid bid for stress ulcer prophylaxis


S/P systemic TPA with sig improvement of hypoxia .On Heparin drip.


Repeat CTA chest, reviewed


Follow cardiology recommendations-- continued management of AFIB- now rate 

controlled 


ECHO revied EF 50% 


BLE DPLX positive for LLE DVT 


NEBS


 A/C -- heparin gtt . Can start Eliquis


hypercoagulable panel as an outpatient.


Cath Monday.


3-6 months of AC








D/W RN, pt











KERWIN UNDERWOOD MD               Aug 16, 2020 12:39

## 2020-08-16 NOTE — PDOC
PROGRESS NOTES


Date of Service:


DATE: 8/16/20 


TIME: 11:42





Chief Complaint


Chief Complaint


impression ==============








Acute hypoxic respiratory failure with worsening hypoxia secondary to saddle 

pulmonary embolism


Markedly increased troponin level secondary to severe right ventricular 

infarction and ischemia


Atrial fibrillation with rapid ventricular response induced by PE  


moderate tricuspid regurgitation with an estimated PAP of 71 mmHg. c/w severe 

pulm hypertension 


Hypertension


Hyperlipidemia 


Lumbar radiculopathy with significant decreased mobility with recent 

nontraumatic fall


Hypothyroidism


Microcytic anemia





History of Present Illness


History of Present Illness


8/15/2020


Patient seen and examined


She just completed another repeat angiography of her chest


I reviewed the films the saddle emboli seems to have resolved quite a bit by my 

eye (awaiting official report from radiologist)


Discussed with RN


Chart reviewed











08/14/2020


Patient seen and examined in ICU 


Patient is off BiPAP and down to 5L O2 per NC  


She received tPA yesterday, now on heparin drip 


COVID negative 


Discussed with RN 


Chart reviewed 








08/13/20


Patient seen and examined in the ICU 


Hypoxia is worsening and she is requiring 100% oxygen on BiPAP


Troponin increased to 31, she has begun tPA per Dr. Patel 


Echo this AM showed normal LV wall motion, EF 50%, RV dilation secondary to 

tricuspid regurgitation 


Awaiting COVID results


Discussed with RN 


Chart reviewed





Vitals


Vitals





Vital Signs








  Date Time  Temp Pulse Resp B/P (MAP) Pulse Ox O2 Delivery O2 Flow Rate FiO2


 


8/16/20 11:17 98.1 142 22 161/78 (105) 96 Nasal Cannula 2.0 





 98.1       











Physical Exam


General:  Alert, Oriented X3, Cooperative, No acute distress


Heart:  Regular rate, Other (AFIB RVR)


Lungs:  Clear


Abdomen:  Normal bowel sounds, Soft, No tenderness


Extremities:  No clubbing, No cyanosis


Skin:  No rashes, No breakdown, No significant lesion





Labs


LABS


LEFT VENTRICLE 


The left ventricle is normal size. There is mild to moderate concentric left 

ventricular hypertrophy. The left ventricular systolic function is normal and 

the ejection fraction is within normal range. The Ejection Fraction is 50%. 

There is normal LV segmental wall motion. Tissue Doppler imaging reveals 

moderate left ventricular diastolic dysfunction.





 RIGHT VENTRICLE 


The right ventricle is moderately to severly dilated measuring 5.2 cm. There is 

normal right ventricular wall thickness. The right ventricular systolic function

is normal.





 ATRIA 


The left atrium is borderline dilated. The right atrium is moderately dilated. 

The interatrial septum bows toward left atrium consistent with elevated right 

atrial pressure. The interatrial septum is intact with no evidence for an atrial

septal defect or patent foramen ovale as noted on 2-D or Doppler imaging.





 AORTIC VALVE 


The aortic valve is thickened but opens well. Doppler and Color Flow revealed no

significant aortic regurgitation. There is no significant aortic valvular 

stenosis. Calculated aortic valve area is 1.69 cm2 with maximum pressure 

gradient of 10 mmHg and mean pressure gradient of 6 mmHg.





 MITRAL VALVE 


The mitral valve is thickened but opens well. There is no evidence of mitral 

valve prolapse. There is no mitral valve stenosis. Doppler and Color-flow 

revealed trace mitral regurgitation.





 TRICUSPID VALVE 


The tricuspid valve is normal in structure and function. Doppler and Color Flow 

revealed mild to moderate tricuspid regurgitation with an estimated PAP of 71 

mmHg. There is no tricuspid valve stenosis.





 PULMONIC VALVE 


The pulmonic valve is not well visualized. Doppler and Color Flow revealed trace

to mild pulmonic valvular regurgitation. There is no pulmonic valvular stenosis.





 GREAT VESSELS 


The aortic root is normal in size. The IVC is dilated.





 PERICARDIAL EFFUSION 


There is no evidence of significant pericardial effusion.





Critical Notification


Critical Value: No





<Conclusion>


The left ventricular systolic function is normal and the ejection fraction is 

within normal range. The Ejection Fraction is 50%.


There is normal LV segmental wall motion.


The right ventricle is moderately to severly dilated measuring 5.2 cm.


Doppler and Color Flow revealed mild to moderate tricuspid regurgitation with an

estimated PAP of 71 mmHg.





Signed by : Angel Watts, 


Electronically Approved : 08/13/2020 11:06:49


Laboratory Tests








Test


 8/16/20


04:00


 


White Blood Count


 7.9 x10^3/uL


(4.0-11.0)


 


Red Blood Count


 3.90 x10^6/uL


(3.50-5.40)


 


Hemoglobin


 8.2 g/dL


(12.0-15.5)


 


Hematocrit


 26.3 %


(36.0-47.0)


 


Mean Corpuscular Volume 68 fL () 


 


Mean Corpuscular Hemoglobin 21 pg (25-35) 


 


Mean Corpuscular Hemoglobin


Concent 31 g/dL


(31-37)


 


Red Cell Distribution Width


 23.0 %


(11.5-14.5)


 


Platelet Count


 378 x10^3/uL


(140-400)


 


Neutrophils (%) (Auto) 68 % (31-73) 


 


Lymphocytes (%) (Auto) 20 % (24-48) 


 


Monocytes (%) (Auto) 7 % (0-9) 


 


Eosinophils (%) (Auto) 5 % (0-3) 


 


Basophils (%) (Auto) 1 % (0-3) 


 


Neutrophils # (Auto)


 5.3 x10^3/uL


(1.8-7.7)


 


Lymphocytes # (Auto)


 1.5 x10^3/uL


(1.0-4.8)


 


Monocytes # (Auto)


 0.5 x10^3/uL


(0.0-1.1)


 


Eosinophils # (Auto)


 0.4 x10^3/uL


(0.0-0.7)


 


Basophils # (Auto)


 0.1 x10^3/uL


(0.0-0.2)


 


Heparin Anti-Xa Act,


Unfractionated 0.77 IU/mL


(0.30-0.70)


 


Sodium Level


 142 mmol/L


(136-145)


 


Potassium Level


 3.4 mmol/L


(3.5-5.1)


 


Chloride Level


 106 mmol/L


()


 


Carbon Dioxide Level


 29 mmol/L


(21-32)


 


Anion Gap 7 (6-14) 


 


Blood Urea Nitrogen 9 mg/dL (7-20) 


 


Creatinine


 0.9 mg/dL


(0.6-1.0)


 


Estimated GFR


(Cockcroft-Gault) 61.9 





 


Glucose Level


 137 mg/dL


(70-99)


 


Calcium Level


 8.7 mg/dL


(8.5-10.1)











Assessment and Plan


Assessmemt and Plan


Problems


Medical Problems:


(1) Atrial fibrillation with RVR


Status: Acute  





(2) Chest pain


Status: Acute  





(3) Saddle embolism of pulmonary artery


Status: Acute  





(4) Suspected COVID-19 virus infection


Status: Acute  











Comment


Review of Relevant


I have reviewed the following items donald (where applicable) has been applied.


Labs





Laboratory Tests








Test


 8/15/20


04:30 8/16/20


04:00


 


White Blood Count


 8.6 x10^3/uL


(4.0-11.0) 7.9 x10^3/uL


(4.0-11.0)


 


Red Blood Count


 3.70 x10^6/uL


(3.50-5.40) 3.90 x10^6/uL


(3.50-5.40)


 


Hemoglobin


 7.9 g/dL


(12.0-15.5) 8.2 g/dL


(12.0-15.5)


 


Hematocrit


 24.9 %


(36.0-47.0) 26.3 %


(36.0-47.0)


 


Mean Corpuscular Volume 67 fL ()  68 fL () 


 


Mean Corpuscular Hemoglobin 21 pg (25-35)  21 pg (25-35) 


 


Mean Corpuscular Hemoglobin


Concent 32 g/dL


(31-37) 31 g/dL


(31-37)


 


Red Cell Distribution Width


 22.5 %


(11.5-14.5) 23.0 %


(11.5-14.5)


 


Platelet Count


 330 x10^3/uL


(140-400) 378 x10^3/uL


(140-400)


 


Neutrophils (%) (Auto) 72 % (31-73)  68 % (31-73) 


 


Lymphocytes (%) (Auto) 16 % (24-48)  20 % (24-48) 


 


Monocytes (%) (Auto) 7 % (0-9)  7 % (0-9) 


 


Eosinophils (%) (Auto) 4 % (0-3)  5 % (0-3) 


 


Basophils (%) (Auto) 1 % (0-3)  1 % (0-3) 


 


Neutrophils # (Auto)


 6.2 x10^3/uL


(1.8-7.7) 5.3 x10^3/uL


(1.8-7.7)


 


Lymphocytes # (Auto)


 1.4 x10^3/uL


(1.0-4.8) 1.5 x10^3/uL


(1.0-4.8)


 


Monocytes # (Auto)


 0.6 x10^3/uL


(0.0-1.1) 0.5 x10^3/uL


(0.0-1.1)


 


Eosinophils # (Auto)


 0.3 x10^3/uL


(0.0-0.7) 0.4 x10^3/uL


(0.0-0.7)


 


Basophils # (Auto)


 0.1 x10^3/uL


(0.0-0.2) 0.1 x10^3/uL


(0.0-0.2)


 


Heparin Anti-Xa Act,


Unfractionated 0.69 IU/mL


(0.30-0.70) 0.77 IU/mL


(0.30-0.70)


 


Sodium Level


 139 mmol/L


(136-145) 142 mmol/L


(136-145)


 


Potassium Level


 3.6 mmol/L


(3.5-5.1) 3.4 mmol/L


(3.5-5.1)


 


Chloride Level


 104 mmol/L


() 106 mmol/L


()


 


Carbon Dioxide Level


 27 mmol/L


(21-32) 29 mmol/L


(21-32)


 


Anion Gap 8 (6-14)  7 (6-14) 


 


Blood Urea Nitrogen


 13 mg/dL


(7-20) 9 mg/dL (7-20) 





 


Creatinine


 0.8 mg/dL


(0.6-1.0) 0.9 mg/dL


(0.6-1.0)


 


Estimated GFR


(Cockcroft-Gault) 70.9 


 61.9 





 


Glucose Level


 116 mg/dL


(70-99) 137 mg/dL


(70-99)


 


Calcium Level


 8.3 mg/dL


(8.5-10.1) 8.7 mg/dL


(8.5-10.1)








Laboratory Tests








Test


 8/16/20


04:00


 


White Blood Count


 7.9 x10^3/uL


(4.0-11.0)


 


Red Blood Count


 3.90 x10^6/uL


(3.50-5.40)


 


Hemoglobin


 8.2 g/dL


(12.0-15.5)


 


Hematocrit


 26.3 %


(36.0-47.0)


 


Mean Corpuscular Volume 68 fL () 


 


Mean Corpuscular Hemoglobin 21 pg (25-35) 


 


Mean Corpuscular Hemoglobin


Concent 31 g/dL


(31-37)


 


Red Cell Distribution Width


 23.0 %


(11.5-14.5)


 


Platelet Count


 378 x10^3/uL


(140-400)


 


Neutrophils (%) (Auto) 68 % (31-73) 


 


Lymphocytes (%) (Auto) 20 % (24-48) 


 


Monocytes (%) (Auto) 7 % (0-9) 


 


Eosinophils (%) (Auto) 5 % (0-3) 


 


Basophils (%) (Auto) 1 % (0-3) 


 


Neutrophils # (Auto)


 5.3 x10^3/uL


(1.8-7.7)


 


Lymphocytes # (Auto)


 1.5 x10^3/uL


(1.0-4.8)


 


Monocytes # (Auto)


 0.5 x10^3/uL


(0.0-1.1)


 


Eosinophils # (Auto)


 0.4 x10^3/uL


(0.0-0.7)


 


Basophils # (Auto)


 0.1 x10^3/uL


(0.0-0.2)


 


Heparin Anti-Xa Act,


Unfractionated 0.77 IU/mL


(0.30-0.70)


 


Sodium Level


 142 mmol/L


(136-145)


 


Potassium Level


 3.4 mmol/L


(3.5-5.1)


 


Chloride Level


 106 mmol/L


()


 


Carbon Dioxide Level


 29 mmol/L


(21-32)


 


Anion Gap 7 (6-14) 


 


Blood Urea Nitrogen 9 mg/dL (7-20) 


 


Creatinine


 0.9 mg/dL


(0.6-1.0)


 


Estimated GFR


(Cockcroft-Gault) 61.9 





 


Glucose Level


 137 mg/dL


(70-99)


 


Calcium Level


 8.7 mg/dL


(8.5-10.1)








Microbiology


8/13/20 Urine Culture - Final, Complete


          


8/12/20 Blood Culture - Preliminary, Resulted


          NO GROWTH AFTER 3 DAYS


Medications





Current Medications


Sodium Chloride 1,000 ml @  1,000 mls/hr 1X  ONCE IV  Last administered on 

8/12/20at 12:09;  Start 8/12/20 at 11:45;  Stop 8/12/20 at 12:44;  Status DC


Ceftriaxone Sodium (Rocephin) 1 gm 1X  ONCE IVP  Last administered on 8/12/20at 

12:07;  Start 8/12/20 at 11:45;  Stop 8/12/20 at 11:47;  Status DC


Iohexol (Omnipaque 350 Mg/ml) 90 ml 1X  ONCE IV ;  Start 8/12/20 at 13:00;  Stop

8/12/20 at 13:01;  Status DC


Metoprolol Tartrate (Lopressor Vial) 5 mg 1X  ONCE IVP  Last administered on 

8/12/20at 13:03;  Start 8/12/20 at 13:15;  Stop 8/12/20 at 13:16;  Status DC


Heparin Sodium/ Dextrose 250 ml @ 0 mls/hr CONT  PRN IV PER PROTOCOL Last 

administered on 8/12/20at 13:47;  Start 8/12/20 at 13:30;  Stop 8/12/20 at 

14:19;  Status DC


Heparin Sodium (Porcine) (Heparin Sodium) 2,150 unit PRN Q6HRS  PRN IV FOR UFH 

LEVEL LESS THAN 0.2 Last administered on 8/12/20at 13:45;  Start 8/12/20 at 

13:30;  Stop 8/12/20 at 14:19;  Status DC


Ondansetron HCl (Zofran) 4 mg PRN Q8HRS  PRN IV NAUSEA/VOMITING Last 

administered on 8/12/20at 17:31;  Start 8/12/20 at 13:45;  Stop 8/13/20 at 

13:44;  Status DC


Heparin Sodium/ Dextrose 250 ml @ 0 mls/hr CONT  PRN IV PER PROTOCOL Last 

administered on 8/15/20at 20:37;  Start 8/12/20 at 14:15


Heparin Sodium (Porcine) (Heparin Sodium) 2,600 unit PRN Q6HRS  PRN IV FOR UFH 

LEVEL LESS THAN 0.2;  Start 8/12/20 at 14:15


Heparin Sodium (Porcine) (Heparin Sodium) 1,300 unit PRN Q6HRS  PRN IV FOR UFH 

LEVEL 0.2 - 0.29;  Start 8/12/20 at 14:15


Aspirin (Ecotrin) 325 mg 1X  ONCE PO ;  Start 8/12/20 at 14:45;  Stop 8/12/20 at

14:46;  Status DC


Metoprolol Succinate (Toprol Xl) 25 mg BID PO ;  Start 8/12/20 at 14:30;  Stop 

8/12/20 at 15:49;  Status DC


Metoprolol Tartrate (Lopressor Vial) 5 mg 1X  ONCE IVP  Last administered on 

8/12/20at 14:52;  Start 8/12/20 at 14:45;  Stop 8/12/20 at 14:46;  Status DC


Metoprolol Tartrate (Lopressor Vial) 5 mg 1X  ONCE IVP ;  Start 8/12/20 at 

14:45;  Stop 8/12/20 at 14:46;  Status Cancel


Digoxin (Lanoxin) 500 mcg 1X  ONCE IV  Last administered on 8/12/20at 15:59;  

Start 8/12/20 at 16:15;  Stop 8/12/20 at 16:16;  Status DC


Diltiazem HCl 125 mg/Sodium Chloride 125 ml @ 5 mls/hr CONT  PRN IV SEE I/O 

RECORD Last administered on 8/14/20at 03:24;  Start 8/12/20 at 16:00;  Stop 

8/14/20 at 09:15;  Status DC


Cyclobenzaprine HCl (Flexeril) 10 mg TID PO  Last administered on 8/16/20at 

08:45;  Start 8/12/20 at 21:00


Levothyroxine Sodium (Synthroid) 125 mcg DAILY06 PO  Last administered on 

8/16/20at 06:27;  Start 8/13/20 at 06:00


Tramadol HCl (Ultram) 50 mg PRN DAILY  PRN PO PAIN;  Start 8/12/20 at 18:45


Info (Anti-Coagulation Monitoring By Pharmacy) 1 each PRN DAILY  PRN MC SEE 

COMMENTS Last administered on 8/15/20at 09:58;  Start 8/13/20 at 08:30


Alteplase, Recombinant 100 ml @  50 mls/hr 1X  ONCE IV  Last administered on 

8/13/20at 09:22;  Start 8/13/20 at 09:00;  Stop 8/13/20 at 10:59;  Status DC


Alprazolam (Xanax) 0.25 mg PRN Q6HRS  PRN PO ANXIETY / AGITATION Last 

administered on 8/15/20at 20:32;  Start 8/13/20 at 15:30


Famotidine (Pepcid) 20 mg PRN BID  PRN PO HEARTBURN / GAS Last administered on 

8/15/20at 04:19;  Start 8/13/20 at 23:15;  Stop 8/15/20 at 05:29;  Status DC


Budesonide (Pulmicort) 0.5 mg RTBID NEB  Last administered on 8/15/20at 07:20;  

Start 8/14/20 at 09:00


Levalbuterol HCl (Xopenex) 1.25 mg PRN Q4HRS  PRN NEB SHORTNESS OF BREATH Last 

administered on 8/14/20at 20:05;  Start 8/14/20 at 09:00;  Stop 8/15/20 at 

18:33;  Status DC


Diltiazem HCl (Cardizem 24hr Cd) 240 mg DAILY PO  Last administered on 8/16/20at

08:45;  Start 8/14/20 at 09:15


Famotidine (Pepcid) 20 mg BID PO  Last administered on 8/16/20at 08:45;  Start 

8/15/20 at 09:00


Iohexol (Omnipaque 350 Mg/ml) 100 ml 1X  ONCE IV  Last administered on 8/15/20at

10:30;  Start 8/15/20 at 08:00;  Stop 8/15/20 at 08:01;  Status DC


Ceftriaxone Sodium (Rocephin) 1 gm Q24H IVP  Last administered on 8/15/20at 

14:25;  Start 8/15/20 at 14:00


Albuterol/ Ipratropium (Combivent Respimat  Mcg) 1 puff RTQID INH  Last 

administered on 8/16/20at 08:43;  Start 8/15/20 at 20:00





Active Scripts


Active


Reported


Cetirizine Hcl 10 Mg Tablet 1 Tab PO DAILY


Ditropan Xl (Oxybutynin Chloride) 10 Mg Tab.er.24 10 Mg PO DAILY


Zoloft (Sertraline Hcl) 100 Mg Tablet 100 Mg PO DAILY


Ventolin Hfa Inhaler (Albuterol Sulfate) 18 Gm Hfa.aer.ad 2 Puff INH QID


Advair 100-50 Diskus (Fluticasone/Salmeterol) 1 Each Disk.w.dev 1 Inh IH BID


Xanax (Alprazolam) 0.25 Mg Tablet 0.25 Mg PO PRN Q6HRS PRN


Cyclobenzaprine Hcl 10 Mg Tablet 10 Mg PO TID


Levothyroxine Sodium 125 Mcg Tablet 125 Mcg PO DAILYAC


Ranitidine Hcl 300 Mg Capsule 300 Mg PO DAILY


Tramadol Hcl 50 Mg Tablet 50 Mg PO DAILY PRN


Bystolic (Nebivolol) 5 Mg Tablet 5 Mg PO DAILY


Vitals/I & O





Vital Sign - Last 24 Hours








 8/15/20 8/15/20 8/15/20 8/15/20





 15:40 19:45 19:50 23:32


 


Temp 98.0 98.2  98.2





 98.0 98.2  98.2


 


Pulse 120 114  110


 


Resp 20 20  20


 


B/P (MAP) 153/79 (103)   134/74 (94)


 


Pulse Ox 98 98  99


 


O2 Delivery Nasal Cannula Room Air Nasal Cannula Nasal Cannula


 


O2 Flow Rate 3.5  2.0 2.0


 


    





    





 8/16/20 8/16/20 8/16/20 8/16/20





 03:45 07:40 08:30 08:45


 


Temp 98.2 97.9  





 98.2 97.9  


 


Pulse 104 120  127


 


Resp 20 18  


 


B/P (MAP) 135/65 (88) 137/88 (104)  137/88


 


Pulse Ox 100 94  


 


O2 Delivery Nasal Cannula Nasal Cannula Nasal Cannula 


 


O2 Flow Rate 2.0 2.0 2.0 


 


    





    





 8/16/20   





 11:17   


 


Temp 98.1   





 98.1   


 


Pulse 142   


 


Resp 22   


 


B/P (MAP) 161/78 (105)   


 


Pulse Ox 96   


 


O2 Delivery Nasal Cannula   


 


O2 Flow Rate 2.0   














Intake and Output   


 


 8/15/20 8/15/20 8/16/20





 15:00 23:00 07:00


 


Intake Total  300 ml 760 ml


 


Output Total  300 ml 1900 ml


 


Balance  0 ml -1140 ml











Justicifation of Admission Dx:


Justifications for Admission:


Justification of Admission Dx:  Yes











PEPE ZHAO MD          Aug 16, 2020 11:42

## 2020-08-16 NOTE — NUR
Pt transferred down to 2N as was negative for Covid. Reported pain upon transfer - previous 
RN Alexsander had given pt pain pill. Pt repositioned in bed where back is straight instead of 
slumped. Pt reported some relief with position change. Pt situated and assessed. Zelaya 
emptied of clear yellow urine.

-------------------------------------------------------------------------------

Addendum: 08/17/20 at 0628 by ARASELI RICKETTS RN RN

-------------------------------------------------------------------------------

Pt called out - has been confused for much of the night. Reported that he was freezing. Pt 
given another blanket. Pt lips lack color and pt paler than at start of shift. Temp taken 
99.1. Also reporting shooting pains into lateral aspect of R foot and his inability to lie 
still. Oxycodone had been pulled - but with pt condition, morphine given to help alleviate 
pain faster. Within 10 min pt resting a little  more comfortably. Will continue to monitor 
closely.

## 2020-08-17 VITALS — SYSTOLIC BLOOD PRESSURE: 132 MMHG | DIASTOLIC BLOOD PRESSURE: 64 MMHG

## 2020-08-17 VITALS — DIASTOLIC BLOOD PRESSURE: 65 MMHG | SYSTOLIC BLOOD PRESSURE: 146 MMHG

## 2020-08-17 VITALS — DIASTOLIC BLOOD PRESSURE: 70 MMHG | SYSTOLIC BLOOD PRESSURE: 87 MMHG

## 2020-08-17 VITALS — DIASTOLIC BLOOD PRESSURE: 58 MMHG | SYSTOLIC BLOOD PRESSURE: 84 MMHG

## 2020-08-17 VITALS — DIASTOLIC BLOOD PRESSURE: 87 MMHG | SYSTOLIC BLOOD PRESSURE: 143 MMHG

## 2020-08-17 VITALS — SYSTOLIC BLOOD PRESSURE: 156 MMHG | DIASTOLIC BLOOD PRESSURE: 67 MMHG

## 2020-08-17 VITALS — SYSTOLIC BLOOD PRESSURE: 128 MMHG | DIASTOLIC BLOOD PRESSURE: 69 MMHG

## 2020-08-17 VITALS — SYSTOLIC BLOOD PRESSURE: 132 MMHG | DIASTOLIC BLOOD PRESSURE: 62 MMHG

## 2020-08-17 VITALS — SYSTOLIC BLOOD PRESSURE: 116 MMHG | DIASTOLIC BLOOD PRESSURE: 67 MMHG

## 2020-08-17 VITALS — SYSTOLIC BLOOD PRESSURE: 136 MMHG | DIASTOLIC BLOOD PRESSURE: 6 MMHG

## 2020-08-17 VITALS — DIASTOLIC BLOOD PRESSURE: 77 MMHG | SYSTOLIC BLOOD PRESSURE: 100 MMHG

## 2020-08-17 VITALS — SYSTOLIC BLOOD PRESSURE: 109 MMHG | DIASTOLIC BLOOD PRESSURE: 63 MMHG

## 2020-08-17 VITALS — DIASTOLIC BLOOD PRESSURE: 65 MMHG | SYSTOLIC BLOOD PRESSURE: 145 MMHG

## 2020-08-17 VITALS — SYSTOLIC BLOOD PRESSURE: 136 MMHG | DIASTOLIC BLOOD PRESSURE: 81 MMHG

## 2020-08-17 VITALS — DIASTOLIC BLOOD PRESSURE: 65 MMHG | SYSTOLIC BLOOD PRESSURE: 124 MMHG

## 2020-08-17 VITALS — SYSTOLIC BLOOD PRESSURE: 100 MMHG | DIASTOLIC BLOOD PRESSURE: 77 MMHG

## 2020-08-17 LAB
ANION GAP SERPL CALC-SCNC: 5 MMOL/L (ref 6–14)
BASOPHILS # BLD AUTO: 0.1 X10^3/UL (ref 0–0.2)
BASOPHILS NFR BLD: 1 % (ref 0–3)
BUN SERPL-MCNC: 9 MG/DL (ref 7–20)
CALCIUM SERPL-MCNC: 8.4 MG/DL (ref 8.5–10.1)
CHLORIDE SERPL-SCNC: 105 MMOL/L (ref 98–107)
CO2 SERPL-SCNC: 30 MMOL/L (ref 21–32)
CREAT SERPL-MCNC: 0.7 MG/DL (ref 0.6–1)
EOSINOPHIL NFR BLD: 0.3 X10^3/UL (ref 0–0.7)
EOSINOPHIL NFR BLD: 4 % (ref 0–3)
ERYTHROCYTE [DISTWIDTH] IN BLOOD BY AUTOMATED COUNT: 23.2 % (ref 11.5–14.5)
GFR SERPLBLD BASED ON 1.73 SQ M-ARVRAT: 82.7 ML/MIN
GLUCOSE SERPL-MCNC: 107 MG/DL (ref 70–99)
HCT VFR BLD CALC: 25.7 % (ref 36–47)
HGB BLD-MCNC: 7.9 G/DL (ref 12–15.5)
LYMPHOCYTES # BLD: 1.4 X10^3/UL (ref 1–4.8)
LYMPHOCYTES NFR BLD AUTO: 19 % (ref 24–48)
MCH RBC QN AUTO: 21 PG (ref 25–35)
MCHC RBC AUTO-ENTMCNC: 31 G/DL (ref 31–37)
MCV RBC AUTO: 67 FL (ref 79–100)
MONO #: 0.5 X10^3/UL (ref 0–1.1)
MONOCYTES NFR BLD: 7 % (ref 0–9)
NEUT #: 5 X10^3/UL (ref 1.8–7.7)
NEUTROPHILS NFR BLD AUTO: 69 % (ref 31–73)
PLATELET # BLD AUTO: 423 X10^3/UL (ref 140–400)
POTASSIUM SERPL-SCNC: 4.2 MMOL/L (ref 3.5–5.1)
RBC # BLD AUTO: 3.82 X10^6/UL (ref 3.5–5.4)
SODIUM SERPL-SCNC: 140 MMOL/L (ref 136–145)
WBC # BLD AUTO: 7.3 X10^3/UL (ref 4–11)

## 2020-08-17 RX ADMIN — IPRATROPIUM BROMIDE AND ALBUTEROL SCH PUFF: 20; 100 SPRAY, METERED RESPIRATORY (INHALATION) at 08:00

## 2020-08-17 RX ADMIN — FAMOTIDINE SCH MG: 20 TABLET ORAL at 08:44

## 2020-08-17 RX ADMIN — IPRATROPIUM BROMIDE AND ALBUTEROL SCH PUFF: 20; 100 SPRAY, METERED RESPIRATORY (INHALATION) at 17:18

## 2020-08-17 RX ADMIN — CYCLOBENZAPRINE HYDROCHLORIDE SCH MG: 10 TABLET, FILM COATED ORAL at 13:08

## 2020-08-17 RX ADMIN — PSYLLIUM HUSK SCH PKT: 3.4 POWDER ORAL at 22:45

## 2020-08-17 RX ADMIN — Medication SCH CAP: at 08:40

## 2020-08-17 RX ADMIN — BUDESONIDE SCH MG: 0.5 INHALANT RESPIRATORY (INHALATION) at 08:05

## 2020-08-17 RX ADMIN — Medication SCH CAP: at 22:46

## 2020-08-17 RX ADMIN — IPRATROPIUM BROMIDE AND ALBUTEROL SCH PUFF: 20; 100 SPRAY, METERED RESPIRATORY (INHALATION) at 13:08

## 2020-08-17 RX ADMIN — SODIUM CHLORIDE SCH MLS/HR: 450 INJECTION, SOLUTION INTRAVENOUS at 13:07

## 2020-08-17 RX ADMIN — CYCLOBENZAPRINE HYDROCHLORIDE SCH MG: 10 TABLET, FILM COATED ORAL at 22:46

## 2020-08-17 RX ADMIN — Medication PRN EACH: at 13:54

## 2020-08-17 RX ADMIN — APIXABAN SCH MG: 5 TABLET, FILM COATED ORAL at 13:08

## 2020-08-17 RX ADMIN — FAMOTIDINE SCH MG: 20 TABLET ORAL at 22:45

## 2020-08-17 RX ADMIN — CEFTRIAXONE SCH GM: 1 INJECTION, POWDER, FOR SOLUTION INTRAMUSCULAR; INTRAVENOUS at 13:07

## 2020-08-17 RX ADMIN — CYCLOBENZAPRINE HYDROCHLORIDE SCH MG: 10 TABLET, FILM COATED ORAL at 08:44

## 2020-08-17 RX ADMIN — LEVOTHYROXINE SODIUM SCH MCG: 125 TABLET ORAL at 06:11

## 2020-08-17 RX ADMIN — APIXABAN SCH MG: 5 TABLET, FILM COATED ORAL at 22:45

## 2020-08-17 RX ADMIN — BUDESONIDE SCH MG: 0.5 INHALANT RESPIRATORY (INHALATION) at 19:31

## 2020-08-17 RX ADMIN — Medication SCH EA: at 13:09

## 2020-08-17 RX ADMIN — POLYETHYLENE GLYCOL 3350 SCH GM: 17 POWDER, FOR SOLUTION ORAL at 22:45

## 2020-08-17 NOTE — NUR
SS following up with discharge planning. SS reviewed pt chart and discussed with pt RN. Pt 
currently requiring oxygen. Pt on IV Rocephin. Pt had heart cath today. SS will continue to 
follow for discharge planning.

## 2020-08-17 NOTE — PDOC
MODERATE SEDATION ASSESSMENT


RISKS/ALTERNATIVES


Risks/Alternatives


Risks and alternatives of this type of sedation and procedure discussed with:


RISK/ALTERNATIVES:  Patient





H & P ON CHART


H & P


H & P on chart and reviewed for co-morbid conditions and appropriate labs.


H&P ON CHART:  Yes





PREGNANCY STATUS


PREG STATUS ASSESSED:  N/A





MEDS/ALLERGIES REVIEWED


Meds/Allergies Reviewed


Medications and Allergies including time and route of recently administered 

narcotics and sedatives.


MEDS/ALLERGIES REVIEWED:  Yes





ASA RATING


ASA RATING:  III





AIRWAY ASSESSMENT


Airway Assessment


Airway patency, oral function limitations, presence  of caps, crowns, dentures, 

partials, and ability to extend neck assessed.


AIRWAY ASSESSMENT:  Yes





MALLAMPATI SCORE


MALLAMPATI SCORE:  II





PRE-SEDATION ASSESSMENT


PRE-SEDATION ASSESSMENT:  Yes











MESSI LARKIN MD           Aug 17, 2020 10:50

## 2020-08-17 NOTE — CARD
MR#: [id number]

Account#: [id number]

Accession#: [id number]

Date of Study: 08/17/2020

Ordering Physician: MESSI PARTIDA,

Referring Physician: MESSI PARTIDA,

Tech: RT Doreen (R) 





--------------- APPROVED REPORT --------------





Technologist: RT Doreen (R) FADI

Nurse: Deborah Corbin R.N.



Procedure(s) performed: Left heart catheterization, selective coronary angiography and left ventricul
ography via right transradial approach



FLUORO TIME: 2.4 MIN

DOSE:36 Gycm2

CONTRAST:97 ML

MODERATE SEDATION: 25 MIN



INDICATION

The indication(s) include : non-STEMI .



CS Clinical Frailty Scale

[facility] Clinical Frailty Scale: Mildly Frail



Heart Failure

Heart Failure: No

If Yes, Newly Diagnosed: No



PROCEDURE NARRATIVE

After explaining the risks, benefits and alternative options, informed consent was obtained from lorena
ent.  Patient was brought to the cardiac Cath Lab and right wrist was prepped and draped in the usual
 fashion after confirming a positive modified Justin's test.  Arterial access was obtained in the righ
t radial artery and a 6 Austrian sheath was inserted.  6 Austrian Curtis catheter was used to perform radha
ective angiography of the left and right coronary arteries.  6 Austrian pigtail catheter was used to pe
rform left ventriculography.  Patient tolerated the procedure well.  Hemostasis was achieved using TR
 band.  There were no immediate complications.  The following findings were noted.



FINDINGS

1.  Hemodynamics: Elevated left ventricular end-diastolic pressure of 31 mmHg consistent with acute d
iastolic heart failure.  No pullback gradient across the aortic valve.

2.  Left ventriculography:  Inferoapical wall hypokinesis with ejection fraction estimated at 55%.  N
o significant mitral regurgitation seen.

3.  Coronary angiography:

a.  The left main coronary artery arose from the left sinus of Valsalva, gave rise to the left anteri
or descending and left circumflex arteries and did not show any significant stenosis.

b.  The left anterior descending artery did not show any significant stenosis.

c.  The left circumflex artery did not show any significant stenosis.

d.  The right coronary artery was a large and dominant vessel arising from the right sinus of Valsalv
a that showed small amount of thrombus resulting in 80% stenosis severity in distal segment of poster
ior descending branch.



Conclusion

1.  Small amount of thrombus with 80% luminal obstruction in distal segment of posterior descending b
ranch of right coronary artery.  No lesions needing intervention were noted.

2.  Inferoapical wall hypokinesis with ejection fraction estimated at 55%



Recommendations

Medical Therapy



Signed by : Messi Partida, 

Electronically Approved : 08/17/2020 12:09:08

## 2020-08-17 NOTE — PDOC
PULMONARY PROGRESS NOTES


DATE: 8/17/20 


TIME: 09:45


Subjective


sob is better, cough sputum better  no cp


Pt. is S/P systemic TPA on 8/13/2020, now on N/C oxygen, 2 lpm


Vitals





Vital Signs








  Date Time  Temp Pulse Resp B/P (MAP) Pulse Ox O2 Delivery O2 Flow Rate FiO2


 


8/17/20 08:38  122  136/81    


 


8/17/20 08:07     100 Nasal Cannula 2.0 


 


8/17/20 03:25 97.6  20     





 97.6       








ROS:  No Nausea, No Chest Pain, No Abdominal Pain, No Increase Cough


General:  Alert, No acute distress


Lungs:  Clear


Cardiovascular:  S1, S2


Abdomen:  Soft, Non-tender


Neuro Exam:  Alert


Extremities:  No Edema


Skin:  Warm


Labs





Laboratory Tests








Test


 8/15/20


14:30 8/16/20


04:00 8/16/20


12:20 8/16/20


18:30


 


Coronavirus (PCR)


 Not detected


(Not Detected) 


 


 





 


White Blood Count


 


 7.9 x10^3/uL


(4.0-11.0) 


 





 


Red Blood Count


 


 3.90 x10^6/uL


(3.50-5.40) 


 





 


Hemoglobin


 


 8.2 g/dL


(12.0-15.5) 


 





 


Hematocrit


 


 26.3 %


(36.0-47.0) 


 





 


Mean Corpuscular Volume  68 fL ()   


 


Mean Corpuscular Hemoglobin  21 pg (25-35)   


 


Mean Corpuscular Hemoglobin


Concent 


 31 g/dL


(31-37) 


 





 


Red Cell Distribution Width


 


 23.0 %


(11.5-14.5) 


 





 


Platelet Count


 


 378 x10^3/uL


(140-400) 


 





 


Neutrophils (%) (Auto)  68 % (31-73)   


 


Lymphocytes (%) (Auto)  20 % (24-48)   


 


Monocytes (%) (Auto)  7 % (0-9)   


 


Eosinophils (%) (Auto)  5 % (0-3)   


 


Basophils (%) (Auto)  1 % (0-3)   


 


Neutrophils # (Auto)


 


 5.3 x10^3/uL


(1.8-7.7) 


 





 


Lymphocytes # (Auto)


 


 1.5 x10^3/uL


(1.0-4.8) 


 





 


Monocytes # (Auto)


 


 0.5 x10^3/uL


(0.0-1.1) 


 





 


Eosinophils # (Auto)


 


 0.4 x10^3/uL


(0.0-0.7) 


 





 


Basophils # (Auto)


 


 0.1 x10^3/uL


(0.0-0.2) 


 





 


Heparin Anti-Xa Act,


Unfractionated 


 0.77 IU/mL


(0.30-0.70) 0.67 IU/mL


(0.30-0.70) 0.65 IU/mL


(0.30-0.70)


 


Sodium Level


 


 142 mmol/L


(136-145) 


 





 


Potassium Level


 


 3.4 mmol/L


(3.5-5.1) 


 





 


Chloride Level


 


 106 mmol/L


() 


 





 


Carbon Dioxide Level


 


 29 mmol/L


(21-32) 


 





 


Anion Gap  7 (6-14)   


 


Blood Urea Nitrogen  9 mg/dL (7-20)   


 


Creatinine


 


 0.9 mg/dL


(0.6-1.0) 


 





 


Estimated GFR


(Cockcroft-Gault) 


 61.9 


 


 





 


Glucose Level


 


 137 mg/dL


(70-99) 


 





 


Calcium Level


 


 8.7 mg/dL


(8.5-10.1) 


 





 


Test


 8/17/20


04:45 


 


 





 


White Blood Count


 7.3 x10^3/uL


(4.0-11.0) 


 


 





 


Red Blood Count


 3.82 x10^6/uL


(3.50-5.40) 


 


 





 


Hemoglobin


 7.9 g/dL


(12.0-15.5) 


 


 





 


Hematocrit


 25.7 %


(36.0-47.0) 


 


 





 


Mean Corpuscular Volume 67 fL ()    


 


Mean Corpuscular Hemoglobin 21 pg (25-35)    


 


Mean Corpuscular Hemoglobin


Concent 31 g/dL


(31-37) 


 


 





 


Red Cell Distribution Width


 23.2 %


(11.5-14.5) 


 


 





 


Platelet Count


 423 x10^3/uL


(140-400) 


 


 





 


Neutrophils (%) (Auto) 69 % (31-73)    


 


Lymphocytes (%) (Auto) 19 % (24-48)    


 


Monocytes (%) (Auto) 7 % (0-9)    


 


Eosinophils (%) (Auto) 4 % (0-3)    


 


Basophils (%) (Auto) 1 % (0-3)    


 


Neutrophils # (Auto)


 5.0 x10^3/uL


(1.8-7.7) 


 


 





 


Lymphocytes # (Auto)


 1.4 x10^3/uL


(1.0-4.8) 


 


 





 


Monocytes # (Auto)


 0.5 x10^3/uL


(0.0-1.1) 


 


 





 


Eosinophils # (Auto)


 0.3 x10^3/uL


(0.0-0.7) 


 


 





 


Basophils # (Auto)


 0.1 x10^3/uL


(0.0-0.2) 


 


 





 


Heparin Anti-Xa Act,


Unfractionated 0.47 IU/mL


(0.30-0.70) 


 


 





 


Sodium Level


 140 mmol/L


(136-145) 


 


 





 


Potassium Level


 4.2 mmol/L


(3.5-5.1) 


 


 





 


Chloride Level


 105 mmol/L


() 


 


 





 


Carbon Dioxide Level


 30 mmol/L


(21-32) 


 


 





 


Anion Gap 5 (6-14)    


 


Blood Urea Nitrogen 9 mg/dL (7-20)    


 


Creatinine


 0.7 mg/dL


(0.6-1.0) 


 


 





 


Estimated GFR


(Cockcroft-Gault) 82.7 


 


 


 





 


Glucose Level


 107 mg/dL


(70-99) 


 


 





 


Calcium Level


 8.4 mg/dL


(8.5-10.1) 


 


 











Laboratory Tests








Test


 8/16/20


12:20 8/16/20


18:30 8/17/20


04:45


 


Heparin Anti-Xa Act,


Unfractionated 0.67 IU/mL


(0.30-0.70) 0.65 IU/mL


(0.30-0.70) 0.47 IU/mL


(0.30-0.70)


 


White Blood Count


 


 


 7.3 x10^3/uL


(4.0-11.0)


 


Red Blood Count


 


 


 3.82 x10^6/uL


(3.50-5.40)


 


Hemoglobin


 


 


 7.9 g/dL


(12.0-15.5)


 


Hematocrit


 


 


 25.7 %


(36.0-47.0)


 


Mean Corpuscular Volume   67 fL () 


 


Mean Corpuscular Hemoglobin   21 pg (25-35) 


 


Mean Corpuscular Hemoglobin


Concent 


 


 31 g/dL


(31-37)


 


Red Cell Distribution Width


 


 


 23.2 %


(11.5-14.5)


 


Platelet Count


 


 


 423 x10^3/uL


(140-400)


 


Neutrophils (%) (Auto)   69 % (31-73) 


 


Lymphocytes (%) (Auto)   19 % (24-48) 


 


Monocytes (%) (Auto)   7 % (0-9) 


 


Eosinophils (%) (Auto)   4 % (0-3) 


 


Basophils (%) (Auto)   1 % (0-3) 


 


Neutrophils # (Auto)


 


 


 5.0 x10^3/uL


(1.8-7.7)


 


Lymphocytes # (Auto)


 


 


 1.4 x10^3/uL


(1.0-4.8)


 


Monocytes # (Auto)


 


 


 0.5 x10^3/uL


(0.0-1.1)


 


Eosinophils # (Auto)


 


 


 0.3 x10^3/uL


(0.0-0.7)


 


Basophils # (Auto)


 


 


 0.1 x10^3/uL


(0.0-0.2)


 


Sodium Level


 


 


 140 mmol/L


(136-145)


 


Potassium Level


 


 


 4.2 mmol/L


(3.5-5.1)


 


Chloride Level


 


 


 105 mmol/L


()


 


Carbon Dioxide Level


 


 


 30 mmol/L


(21-32)


 


Anion Gap   5 (6-14) 


 


Blood Urea Nitrogen   9 mg/dL (7-20) 


 


Creatinine


 


 


 0.7 mg/dL


(0.6-1.0)


 


Estimated GFR


(Cockcroft-Gault) 


 


 82.7 





 


Glucose Level


 


 


 107 mg/dL


(70-99)


 


Calcium Level


 


 


 8.4 mg/dL


(8.5-10.1)








Medications





Active Scripts








 Medications  Dose


 Route/Sig


 Max Daily Dose Days Date Category


 


 Cetirizine Hcl 10


 Mg Tablet  1 Tab


 PO DAILY


   8/13/20 Reported


 


 Ditropan Xl


  (Oxybutynin


 Chloride) 10 Mg


 Tab.er.24  10 Mg


 PO DAILY


   9/14/16 Reported


 


 Zoloft


  (Sertraline Hcl)


 100 Mg Tablet  100 Mg


 PO DAILY


   9/14/16 Reported


 


 Ventolin Hfa


 Inhaler


  (Albuterol


 Sulfate) 18 Gm


 Hfa.aer.ad  2 Puff


 INH QID


   9/14/16 Reported


 


 Advair 100-50


 Diskus


  (Fluticasone/Salmeterol)


 1 Each Disk.w.dev  1 Inh


 IH BID


   9/14/16 Reported


 


 Xanax


  (Alprazolam) 0.25


 Mg Tablet  0.25 Mg


 PO PRN Q6HRS PRN


   9/14/16 Reported


 


 Cyclobenzaprine


 Hcl 10 Mg Tablet  10 Mg


 PO TID


   9/14/16 Reported


 


 Levothyroxine


 Sodium 125 Mcg


 Tablet  125 Mcg


 PO DAILYAC


   9/14/16 Reported


 


 Ranitidine Hcl


 300 Mg Capsule  300 Mg


 PO DAILY


   9/14/16 Reported


 


 Tramadol Hcl 50


 Mg Tablet  50 Mg


 PO DAILY PRN


   9/14/16 Reported


 


 Bystolic


  (Nebivolol) 5 Mg


 Tablet  5 Mg


 PO DAILY


   9/14/16 Reported








Comments


cta 8/15/20 reviewed


Continued presence of multiple smaller bilateral pulmonary emboli but 


resolution of the saddle embolus seen previously. New patchy areas of 


groundglass infiltrates bilaterally especially peripherally in the left 


upper lobe. These may reflect areas of atelectasis but recommend clinical 


correlation and follow-up.





BLE U/S 


IMPRESSION:


*   Deep vein thrombosis seen on the left.





ECHIO 8/13/2020 


<Conclusion>


The left ventricular systolic function is normal and the ejection fraction is 

within normal range. The Ejection Fraction is 50%.


There is normal LV segmental wall motion.


The right ventricle is moderately to severly dilated measuring 5.2 cm.


Doppler and Color Flow revealed mild to moderate tricuspid regurgitation with an

estimated PAP of 71 mmHg.





Impression


.


IMPRESSION:


1.  Acute hypoxic respiratory failure secondary to acute massive pulmonary 

embolism .  Risk factor is likely related to recent immobility for


the last 2 weeks due to her lumbar radiculopathy and also from the fall when she


twisted her knee.  Underlying obesity would be another risk factor.  She has no


known cancers.  Not on any form of estrogens.-- s/p systemic TPA with sig 

improvement, off BIPAP, oxygenation improved.


2.  Atrial fibrillation with rapid ventricular response, likely caused by right 

ventricular strain., Rx with cardizem


3.  No significant tobacco history.


4.  No history of any hypercoagulable state.


5. LLE DVT 


6. Markedly elevated troponin (peak 31). Out of proportion to usually seen in RV

infarction. [facility] per cardiology today





Plan


.


RECOMMENDATIONS:


Continue supplemental oxygen to keep sats above 92 %, now on N/C 2 lpm


abx added for increased cough, yellow sputum and infilt


covid19 neg


pepcid bid for stress ulcer prophylaxis


S/P systemic TPA with sig improvement of hypoxia .On Heparin drip.


Repeat CTA chest, reviewed. much improved saddle emboli from central PA


Follow cardiology recommendations-- continued management of AFIB- now rate 

controlled 


cath today


ECHO revied EF 50% 


BLE DPLX positive for LLE DVT 


NEBS


 A/C -- heparin gtt . Can start Eliquis post cath


hypercoagulable panel as an outpatient.


3-6 months of AC








D/W RN, pt











UMU FRAZIER MD                 Aug 17, 2020 09:48

## 2020-08-17 NOTE — PDOC
[facility] [facility] PROGRESS NOTE


Date of Service


DOS:


DATE: 8/17/20 


TIME: 11:34





Chief Complaint


Chief Complaint


impression ==============








Acute hypoxic respiratory failure with worsening hypoxia secondary to saddle 

pulmonary embolism


Markedly increased troponin level secondary to severe right ventricular inf

arction and ischemia


Atrial fibrillation with rapid ventricular response induced by PE  


moderate tricuspid regurgitation with an estimated PAP of 71 mmHg. c/w severe 

pulm hypertension 


Hypertension


Hyperlipidemia 


Lumbar radiculopathy with significant decreased mobility with recent 

nontraumatic fall


Hypothyroidism


Microcytic anemia





History of Present Illness


History of Present Illness


8/17/2020


Patient seen and examined


Resting with NAD


Awaiting Cardiac Cath Lab to r/o Coronary Artery Disease


If negative cath, hope to change anticoagulant to PO


Discussed with RN


Reviewed chart.











8/15/2020


Patient seen and examined


She just completed another repeat angiography of her chest


I reviewed the films the saddle emboli seems to have resolved quite a bit by my 

eye (awaiting official report from radiologist)


Discussed with RN


Chart reviewed











08/14/2020


Patient seen and examined in ICU 


Patient is off BiPAP and down to 5L O2 per NC  


She received tPA yesterday, now on heparin drip 


COVID negative 


Discussed with RN 


Chart reviewed 








08/13/20


Patient seen and examined in the ICU 


Hypoxia is worsening and she is requiring 100% oxygen on BiPAP


Troponin increased to 31, she has begun tPA per Dr. Patel 


Echo this AM showed normal LV wall motion, EF 50%, RV dilation secondary to 

tricuspid regurgitation 


Awaiting COVID results


Discussed with RN 


Chart reviewed





Vitals/I&O


Vitals/I&O:





                                   Vital Signs








  Date Time  Temp Pulse Resp B/P (MAP) Pulse Ox O2 Delivery O2 Flow Rate FiO2


 


8/17/20 11:11   22  98 Nasal Cannula 2.0 


 


8/17/20 11:11  111  116/67    


 


8/17/20 07:00 97.4       





 97.4       














                                    I & O   


 


 8/16/20 8/16/20 8/17/20





 15:00 23:00 07:00


 


Intake Total  300 ml 256 ml


 


Output Total  800 ml 1275 ml


 


Balance  -500 ml -1019 ml











Physical Exam


General:  Alert, Oriented X3, Cooperative, No acute distress


Heart:  Regular rate, Other (AFIB RVR)


Lungs:  Clear


Abdomen:  Normal bowel sounds, Soft, No tenderness


Extremities:  No clubbing, No cyanosis


Skin:  No rashes, No breakdown, No significant lesion





Labs


Labs:





Laboratory Tests








Test


 8/16/20


12:20 8/16/20


18:30 8/17/20


04:45


 


Heparin Anti-Xa Act,


Unfractionated 0.67 IU/mL


(0.30-0.70) 0.65 IU/mL


(0.30-0.70) 0.47 IU/mL


(0.30-0.70)


 


White Blood Count


 


 


 7.3 x10^3/uL


(4.0-11.0)


 


Red Blood Count


 


 


 3.82 x10^6/uL


(3.50-5.40)


 


Hemoglobin


 


 


 7.9 g/dL


(12.0-15.5)


 


Hematocrit


 


 


 25.7 %


(36.0-47.0)


 


Mean Corpuscular Volume   67 fL () 


 


Mean Corpuscular Hemoglobin   21 pg (25-35) 


 


Mean Corpuscular Hemoglobin


Concent 


 


 31 g/dL


(31-37)


 


Red Cell Distribution Width


 


 


 23.2 %


(11.5-14.5)


 


Platelet Count


 


 


 423 x10^3/uL


(140-400)


 


Neutrophils (%) (Auto)   69 % (31-73) 


 


Lymphocytes (%) (Auto)   19 % (24-48) 


 


Monocytes (%) (Auto)   7 % (0-9) 


 


Eosinophils (%) (Auto)   4 % (0-3) 


 


Basophils (%) (Auto)   1 % (0-3) 


 


Neutrophils # (Auto)


 


 


 5.0 x10^3/uL


(1.8-7.7)


 


Lymphocytes # (Auto)


 


 


 1.4 x10^3/uL


(1.0-4.8)


 


Monocytes # (Auto)


 


 


 0.5 x10^3/uL


(0.0-1.1)


 


Eosinophils # (Auto)


 


 


 0.3 x10^3/uL


(0.0-0.7)


 


Basophils # (Auto)


 


 


 0.1 x10^3/uL


(0.0-0.2)


 


Sodium Level


 


 


 140 mmol/L


(136-145)


 


Potassium Level


 


 


 4.2 mmol/L


(3.5-5.1)


 


Chloride Level


 


 


 105 mmol/L


()


 


Carbon Dioxide Level


 


 


 30 mmol/L


(21-32)


 


Anion Gap   5 (6-14) 


 


Blood Urea Nitrogen   9 mg/dL (7-20) 


 


Creatinine


 


 


 0.7 mg/dL


(0.6-1.0)


 


Estimated GFR


(Cockcroft-Gault) 


 


 82.7 





 


Glucose Level


 


 


 107 mg/dL


(70-99)


 


Calcium Level


 


 


 8.4 mg/dL


(8.5-10.1)











Review of Systems


Review of Systems:


denies pain


denies weakness





Assessment and Plan


Assessmemt and Plan


Problems


Medical Problems:


(1) Atrial fibrillation with RVR


Status: Acute  





(2) Chest pain


Status: Acute  





(3) Saddle embolism of pulmonary artery


Status: Acute  





(4) Suspected COVID-19 virus infection


Status: Acute  








Assessment


Acute hypoxic respiratory failure with worsening hypoxia secondary to saddle 

pulmonary embolism


Markedly increased troponin level secondary to severe right ventricular 

infarction and ischemia


Atrial fibrillation with rapid ventricular response induced by PE  


Hypertension


Hyperlipidemia 


Lumbar radiculopathy with significant decreased mobility with recent 

nontraumatic fall


Hypothyroidism


Microcytic anemia 





Plan


Continue Heparin protocol


Awaiting CT angiogram result - if negative, switch to anticoagulant PO 


Oxygen supplementation


Cardiac monitoring 


Trend hemoglobin


Monitor ABGs 


Home meds 


Full code 


COVID Negative 


Discharge disposition pending





Comment


Review of Relevant


I have reviewed the following items donald (where applicable) has been applied.


Medications:





Current Medications








 Medications


  (Trade)  Dose


 Ordered  Sig/Mary


 Route


 PRN Reason  Start Time


 Stop Time Status Last Admin


Dose Admin


 


 Lactobacillus


 Rhamnosus


  (Culturelle)  1 cap  BID


 PO


   8/16/20 21:00


    8/16/20 21:14





 


 Potassium Chloride


  (Klor-Con)  60 meq  1X  ONCE


 PO


   8/16/20 15:00


 8/16/20 15:01 DC 8/16/20 14:52





 


 Nitroglycerin


  (Nitroglycerin)  200 mcg  1X  ONCE


 IART


   8/17/20 11:00


 8/17/20 11:03 DC 8/17/20 11:10





 


 Verapamil HCl


  (Verapamil)  2.5 mg  1X  ONCE


 IART


   8/17/20 11:00


 8/17/20 11:03 DC 8/17/20 11:11





 


 Heparin Sodium


  (Porcine)


  (Heparin Sodium)  2,500 unit  1X  ONCE


 IART


   8/17/20 11:00


 8/17/20 11:03 DC 8/17/20 11:12





 


 Heparin Sodium/


 Sodium Chloride


  (HEPARIN for


 ARTERIAL LINE


 FLUSH)  1,000 unit  1X  ONCE


 IART


   8/17/20 11:00


 8/17/20 11:03 DC 8/17/20 11:09





 


 Midazolam HCl


  (Versed)  5 mg  1X  ONCE


 IV


   8/17/20 11:00


 8/17/20 11:03 DC 8/17/20 11:12





 


 Fentanyl Citrate


  (Fentanyl 2ml


 Vial)  100 mcg  1X  ONCE


 IV


   8/17/20 11:00


 8/17/20 11:03 DC 8/17/20 11:11





 


 Iohexol


  (Omnipaque 300


 Mg/ml)  100 ml  1X  ONCE


 IART


   8/17/20 11:00


 8/17/20 11:03 DC 8/17/20 11:10





 


 Lidocaine HCl


  (Xylocaine-Mpf


 1% 2ml Vial)  2 ml  1X  ONCE


 INJ


   8/17/20 11:00


 8/17/20 11:03 DC 8/17/20 11:10














Justicifation of Admission Dx:


Justifications for Admission:


Justification of Admission Dx:  Yes











ARUN GARDINER III DO           Aug 17, 2020 11:47

## 2020-08-18 VITALS — SYSTOLIC BLOOD PRESSURE: 132 MMHG | DIASTOLIC BLOOD PRESSURE: 63 MMHG

## 2020-08-18 VITALS — DIASTOLIC BLOOD PRESSURE: 84 MMHG | SYSTOLIC BLOOD PRESSURE: 133 MMHG

## 2020-08-18 VITALS — DIASTOLIC BLOOD PRESSURE: 81 MMHG | SYSTOLIC BLOOD PRESSURE: 149 MMHG

## 2020-08-18 VITALS — DIASTOLIC BLOOD PRESSURE: 77 MMHG | SYSTOLIC BLOOD PRESSURE: 150 MMHG

## 2020-08-18 VITALS — DIASTOLIC BLOOD PRESSURE: 80 MMHG | SYSTOLIC BLOOD PRESSURE: 159 MMHG

## 2020-08-18 VITALS — SYSTOLIC BLOOD PRESSURE: 152 MMHG | DIASTOLIC BLOOD PRESSURE: 75 MMHG

## 2020-08-18 VITALS — SYSTOLIC BLOOD PRESSURE: 140 MMHG | DIASTOLIC BLOOD PRESSURE: 76 MMHG

## 2020-08-18 VITALS — DIASTOLIC BLOOD PRESSURE: 73 MMHG | SYSTOLIC BLOOD PRESSURE: 129 MMHG

## 2020-08-18 VITALS — SYSTOLIC BLOOD PRESSURE: 131 MMHG | DIASTOLIC BLOOD PRESSURE: 70 MMHG

## 2020-08-18 VITALS — SYSTOLIC BLOOD PRESSURE: 155 MMHG | DIASTOLIC BLOOD PRESSURE: 76 MMHG

## 2020-08-18 VITALS — SYSTOLIC BLOOD PRESSURE: 145 MMHG | DIASTOLIC BLOOD PRESSURE: 84 MMHG

## 2020-08-18 VITALS — SYSTOLIC BLOOD PRESSURE: 140 MMHG | DIASTOLIC BLOOD PRESSURE: 75 MMHG

## 2020-08-18 VITALS — SYSTOLIC BLOOD PRESSURE: 154 MMHG | DIASTOLIC BLOOD PRESSURE: 89 MMHG

## 2020-08-18 LAB
ANION GAP SERPL CALC-SCNC: 8 MMOL/L (ref 6–14)
BASOPHILS # BLD AUTO: 0.1 X10^3/UL (ref 0–0.2)
BASOPHILS NFR BLD: 1 % (ref 0–3)
BUN SERPL-MCNC: 8 MG/DL (ref 7–20)
CALCIUM SERPL-MCNC: 9.2 MG/DL (ref 8.5–10.1)
CHLORIDE SERPL-SCNC: 103 MMOL/L (ref 98–107)
CO2 SERPL-SCNC: 28 MMOL/L (ref 21–32)
CREAT SERPL-MCNC: 0.7 MG/DL (ref 0.6–1)
EOSINOPHIL NFR BLD: 0.3 X10^3/UL (ref 0–0.7)
EOSINOPHIL NFR BLD: 5 % (ref 0–3)
ERYTHROCYTE [DISTWIDTH] IN BLOOD BY AUTOMATED COUNT: 22.8 % (ref 11.5–14.5)
GFR SERPLBLD BASED ON 1.73 SQ M-ARVRAT: 82.7 ML/MIN
GLUCOSE SERPL-MCNC: 127 MG/DL (ref 70–99)
HCT VFR BLD CALC: 26.3 % (ref 36–47)
HGB BLD-MCNC: 8.4 G/DL (ref 12–15.5)
LYMPHOCYTES # BLD: 1.2 X10^3/UL (ref 1–4.8)
LYMPHOCYTES NFR BLD AUTO: 18 % (ref 24–48)
MCH RBC QN AUTO: 22 PG (ref 25–35)
MCHC RBC AUTO-ENTMCNC: 32 G/DL (ref 31–37)
MCV RBC AUTO: 67 FL (ref 79–100)
MONO #: 0.5 X10^3/UL (ref 0–1.1)
MONOCYTES NFR BLD: 8 % (ref 0–9)
NEUT #: 4.6 X10^3/UL (ref 1.8–7.7)
NEUTROPHILS NFR BLD AUTO: 69 % (ref 31–73)
PLATELET # BLD AUTO: 442 X10^3/UL (ref 140–400)
POTASSIUM SERPL-SCNC: 4 MMOL/L (ref 3.5–5.1)
RBC # BLD AUTO: 3.92 X10^6/UL (ref 3.5–5.4)
SODIUM SERPL-SCNC: 139 MMOL/L (ref 136–145)
WBC # BLD AUTO: 6.8 X10^3/UL (ref 4–11)

## 2020-08-18 PROCEDURE — 5A2204Z RESTORATION OF CARDIAC RHYTHM, SINGLE: ICD-10-PCS | Performed by: INTERNAL MEDICINE

## 2020-08-18 PROCEDURE — B24BZZ4 ULTRASONOGRAPHY OF HEART WITH AORTA, TRANSESOPHAGEAL: ICD-10-PCS | Performed by: INTERNAL MEDICINE

## 2020-08-18 PROCEDURE — B2151ZZ FLUOROSCOPY OF LEFT HEART USING LOW OSMOLAR CONTRAST: ICD-10-PCS | Performed by: INTERNAL MEDICINE

## 2020-08-18 PROCEDURE — B2111ZZ FLUOROSCOPY OF MULTIPLE CORONARY ARTERIES USING LOW OSMOLAR CONTRAST: ICD-10-PCS | Performed by: INTERNAL MEDICINE

## 2020-08-18 PROCEDURE — 4A023N7 MEASUREMENT OF CARDIAC SAMPLING AND PRESSURE, LEFT HEART, PERCUTANEOUS APPROACH: ICD-10-PCS | Performed by: INTERNAL MEDICINE

## 2020-08-18 RX ADMIN — APIXABAN SCH MG: 5 TABLET, FILM COATED ORAL at 08:33

## 2020-08-18 RX ADMIN — Medication SCH EA: at 08:32

## 2020-08-18 RX ADMIN — IPRATROPIUM BROMIDE AND ALBUTEROL SCH PUFF: 20; 100 SPRAY, METERED RESPIRATORY (INHALATION) at 01:25

## 2020-08-18 RX ADMIN — METOPROLOL TARTRATE SCH MG: 50 TABLET, FILM COATED ORAL at 22:10

## 2020-08-18 RX ADMIN — LEVOTHYROXINE SODIUM SCH MCG: 125 TABLET ORAL at 06:08

## 2020-08-18 RX ADMIN — IPRATROPIUM BROMIDE AND ALBUTEROL SCH PUFF: 20; 100 SPRAY, METERED RESPIRATORY (INHALATION) at 17:39

## 2020-08-18 RX ADMIN — SODIUM CHLORIDE SCH MLS/HR: 450 INJECTION, SOLUTION INTRAVENOUS at 06:10

## 2020-08-18 RX ADMIN — Medication SCH CAP: at 22:06

## 2020-08-18 RX ADMIN — CYCLOBENZAPRINE HYDROCHLORIDE SCH MG: 10 TABLET, FILM COATED ORAL at 14:15

## 2020-08-18 RX ADMIN — IPRATROPIUM BROMIDE AND ALBUTEROL SCH PUFF: 20; 100 SPRAY, METERED RESPIRATORY (INHALATION) at 08:00

## 2020-08-18 RX ADMIN — IPRATROPIUM BROMIDE AND ALBUTEROL SCH PUFF: 20; 100 SPRAY, METERED RESPIRATORY (INHALATION) at 22:06

## 2020-08-18 RX ADMIN — CEFTRIAXONE SCH GM: 1 INJECTION, POWDER, FOR SOLUTION INTRAMUSCULAR; INTRAVENOUS at 14:15

## 2020-08-18 RX ADMIN — FAMOTIDINE SCH MG: 20 TABLET ORAL at 22:05

## 2020-08-18 RX ADMIN — IPRATROPIUM BROMIDE AND ALBUTEROL SCH PUFF: 20; 100 SPRAY, METERED RESPIRATORY (INHALATION) at 12:00

## 2020-08-18 RX ADMIN — POLYETHYLENE GLYCOL 3350 SCH GM: 17 POWDER, FOR SOLUTION ORAL at 08:31

## 2020-08-18 RX ADMIN — CYCLOBENZAPRINE HYDROCHLORIDE SCH MG: 10 TABLET, FILM COATED ORAL at 22:06

## 2020-08-18 RX ADMIN — POLYETHYLENE GLYCOL 3350 SCH GM: 17 POWDER, FOR SOLUTION ORAL at 09:00

## 2020-08-18 RX ADMIN — Medication SCH CAP: at 08:32

## 2020-08-18 RX ADMIN — BUDESONIDE SCH MG: 0.5 INHALANT RESPIRATORY (INHALATION) at 19:33

## 2020-08-18 RX ADMIN — CYCLOBENZAPRINE HYDROCHLORIDE SCH MG: 10 TABLET, FILM COATED ORAL at 08:32

## 2020-08-18 RX ADMIN — PSYLLIUM HUSK SCH PKT: 3.4 POWDER ORAL at 22:05

## 2020-08-18 RX ADMIN — BUDESONIDE SCH MG: 0.5 INHALANT RESPIRATORY (INHALATION) at 08:19

## 2020-08-18 RX ADMIN — SODIUM CHLORIDE SCH MLS/HR: 450 INJECTION, SOLUTION INTRAVENOUS at 21:42

## 2020-08-18 RX ADMIN — FAMOTIDINE SCH MG: 20 TABLET ORAL at 08:32

## 2020-08-18 RX ADMIN — APIXABAN SCH MG: 5 TABLET, FILM COATED ORAL at 22:06

## 2020-08-18 RX ADMIN — AMIODARONE HYDROCHLORIDE SCH MG: 200 TABLET ORAL at 12:51

## 2020-08-18 NOTE — NUR
pt on 3 liters of oxygen, she desats to 80% while awake and more at night. started at 1 
liter. also a mouth breathier. states she has asthma real bad, lcrn

## 2020-08-18 NOTE — EKG
[facility]

              [address] [place] [address]

Test Date:    2020               Test Time:    10:54:21

Pat Name:     LYLE JOHN              Department:   

Patient ID:   [id number]           Room:         210 1

Gender:       F                        Technician:   SHAR

:          1950               Requested By: MESSI LARKIN

Order Number: [id number]           Reading MD:   Angel Watts MD

                                 Measurements

Intervals                              Axis          

Rate:         94                       P:            38

RI:           174                      QRS:          -28

QRSD:         86                       T:            41

QT:           354                                    

QTc:          448                                    

                           Interpretive Statements

SINUS RHYTHM



CONSISTENT WITH INFERIOR INFARCT

PROBABLY OLD

Electronically Signed On 2020 13:45:19 CDT by Angel Watts MD

## 2020-08-18 NOTE — PDOC4
Procedure Note


Procedure:


SAMANTHA guided cardioversion





Indications:


Atrial fibrillation with difficult to control RVR





Complications:


None





Procedural Details:


An informed consent was obtained from patient.  SAMANTHA with standard tomographic 

images was obtained that ruled out any intracardiac thrombus.  She was given IV 

propofol for deep sedation.  200 J of synchronized biphasic DC current was then 

administered with successful conversion of rhythm from atrial fibrillation to 

SR.  She was hemodynamically stable and without any neurological deficits at the

end of procedure.  There were no immediate complications.





Conclusions:


Successful SAMANTHA guided cardioversion of atrial fibrillation to SR











MESSI LARKIN MD           Aug 18, 2020 19:33

## 2020-08-18 NOTE — NUR
SS following up with discharge planning. SS reviewed pt chart and discussed with pt RN. Pt 
is currently requiring oxygen. Pt on IV Rocephin. Pt had heart cath yesterday and getting 
cardioversion today. Pt on Amiodarone drip. SS will continue to follow for discharge 
planning.

## 2020-08-18 NOTE — PDOC
[facility] [facility] PROGRESS NOTE


Date of Service


DOS:


DATE: 8/18/20 


TIME: 12:51





Chief Complaint


Chief Complaint





Acute hypoxic respiratory failure with worsening hypoxia secondary to saddle 

pulmonary embolism


Markedly increased troponin level secondary to severe right ventricular 

infarction and ischemia


Atrial fibrillation with rapid ventricular response induced by PE  


moderate tricuspid regurgitation with an estimated PAP of 71 mmHg. c/w severe 

pulm hypertension 


Status post cardiac cath yesterday no new stents placed


Going for defibrillation today


Hypertension


Hyperlipidemia 


Lumbar radiculopathy with significant decreased mobility with recent 

nontraumatic fall


Hypothyroidism


Microcytic anemia





History of Present Illness


History of Present Illness


8/18/2020


Patient seen and examined


She is going for defibrillation this morning


Chart reviewed


Discussed with RN


Discussed with case management


Cardiac cath yesterday without stents











8/17/2020


Patient seen and examined


Resting with NAD


Awaiting Cardiac Cath Lab to r/o Coronary Artery Disease


If negative cath, hope to change anticoagulant to PO


Discussed with RN


Reviewed chart.











8/15/2020


Patient seen and examined


She just completed another repeat angiography of her chest


I reviewed the films the saddle emboli seems to have resolved quite a bit by my 

eye (awaiting official report from radiologist)


Discussed with RN


Chart reviewed











08/14/2020


Patient seen and examined in ICU 


Patient is off BiPAP and down to 5L O2 per NC  


She received tPA yesterday, now on heparin drip 


COVID negative 


Discussed with RN 


Chart reviewed 








08/13/20


Patient seen and examined in the ICU 


Hypoxia is worsening and she is requiring 100% oxygen on BiPAP


Troponin increased to 31, she has begun tPA per Dr. Patel 


Echo this AM showed normal LV wall motion, EF 50%, RV dilation secondary to 

tricuspid regurgitation 


Awaiting COVID results


Discussed with RN 


Chart reviewed





Vitals/I&O


Vitals/I&O:





                                   Vital Signs








  Date Time  Temp Pulse Resp B/P (MAP) Pulse Ox O2 Delivery O2 Flow Rate FiO2


 


8/18/20 11:10 97.6 92 20 144/66 97 Nasal Cannula 3.0 





 97.6     Simple Mask  














                                    I & O   


 


 8/17/20 8/17/20 8/18/20





 14:59 22:59 06:59


 


Intake Total   400 ml


 


Output Total   2250 ml


 


Balance   -1850 ml











Physical Exam


General:  Alert, Oriented X3, Cooperative, No acute distress


Heart:  Regular rate, Other (AFIB RVR)


Lungs:  Clear


Abdomen:  Normal bowel sounds, Soft, No tenderness


Extremities:  No clubbing, No cyanosis


Skin:  No rashes, No breakdown, No significant lesion





Labs


Labs:





Laboratory Tests








Test


 8/18/20


05:42


 


White Blood Count


 6.8 x10^3/uL


(4.0-11.0)


 


Red Blood Count


 3.92 x10^6/uL


(3.50-5.40)


 


Hemoglobin


 8.4 g/dL


(12.0-15.5)


 


Hematocrit


 26.3 %


(36.0-47.0)


 


Mean Corpuscular Volume 67 fL () 


 


Mean Corpuscular Hemoglobin 22 pg (25-35) 


 


Mean Corpuscular Hemoglobin


Concent 32 g/dL


(31-37)


 


Red Cell Distribution Width


 22.8 %


(11.5-14.5)


 


Platelet Count


 442 x10^3/uL


(140-400)


 


Neutrophils (%) (Auto) 69 % (31-73) 


 


Lymphocytes (%) (Auto) 18 % (24-48) 


 


Monocytes (%) (Auto) 8 % (0-9) 


 


Eosinophils (%) (Auto) 5 % (0-3) 


 


Basophils (%) (Auto) 1 % (0-3) 


 


Neutrophils # (Auto)


 4.6 x10^3/uL


(1.8-7.7)


 


Lymphocytes # (Auto)


 1.2 x10^3/uL


(1.0-4.8)


 


Monocytes # (Auto)


 0.5 x10^3/uL


(0.0-1.1)


 


Eosinophils # (Auto)


 0.3 x10^3/uL


(0.0-0.7)


 


Basophils # (Auto)


 0.1 x10^3/uL


(0.0-0.2)


 


Sodium Level


 139 mmol/L


(136-145)


 


Potassium Level


 4.0 mmol/L


(3.5-5.1)


 


Chloride Level


 103 mmol/L


()


 


Carbon Dioxide Level


 28 mmol/L


(21-32)


 


Anion Gap 8 (6-14) 


 


Blood Urea Nitrogen 8 mg/dL (7-20) 


 


Creatinine


 0.7 mg/dL


(0.6-1.0)


 


Estimated GFR


(Cockcroft-Gault) 82.7 





 


Glucose Level


 127 mg/dL


(70-99)


 


Calcium Level


 9.2 mg/dL


(8.5-10.1)











Assessment and Plan


Assessmemt and Plan


Problems


Medical Problems:


(1) Atrial fibrillation with RVR


Status: Acute  





(2) Chest pain


Status: Acute  





(3) Saddle embolism of pulmonary artery


Status: Acute  





(4) Suspected COVID-19 virus infection


Status: Acute  





Saddle pulmonary embolism with respiratory failure


Anticoagulation


Markedly increased troponin level secondary to severe right ventricular 

infarction and ischemia


Atrial fibrillation with rapid ventricular response induced by PE  


moderate tricuspid regurgitation with an estimated PAP of 71 mmHg. c/w severe 

pulm hypertension 


Status post cardiac cath yesterday no new stents placed


Going for defibrillation today


Hypertension


Hyperlipidemia 


Lumbar radiculopathy with significant decreased mobility with recent 

nontraumatic fall


Hypothyroidism


Microcytic anemia 








Plan


Cardiac monitoring


Await cardioversion later this morning


Anticoagulation


Home meds


DVT prophylaxis


Full code


Appreciate subspecialist input





Comment


Review of Relevant


I have reviewed the following items donald (where applicable) has been applied.


Medications:





Current Medications








 Medications


  (Trade)  Dose


 Ordered  Sig/Mary


 Route


 PRN Reason  Start Time


 Stop Time Status Last Admin


Dose Admin


 


 Apixaban


  (Eliquis)  10 mg  BID


 PO


   8/17/20 13:00


 8/23/20 21:01  8/18/20 08:33





 


 Non-Formulary


 Medication  1 ea  DAILY


 PO


   8/17/20 14:00


    8/18/20 08:32





 


 Digoxin


  (Lanoxin)  500 mcg  1X  ONCE


 IV


   8/17/20 18:15


 8/17/20 18:20 DC 8/17/20 18:36





 


 Amiodarone HCl


 150 mg/Dextrose  103 ml @ 


 618 mls/hr  1X  ONCE


 IV


   8/17/20 18:15


 8/17/20 18:24 DC 8/17/20 18:48





 


 Amiodarone HCl


 450 mg/Dextrose  259 ml @ 0


 mls/hr  1X  ONCE


 IV


   8/17/20 18:15


 8/17/20 18:20 DC 8/17/20 18:15





 


 Polyethylene


 Glycol


  (miraLAX PACKET)  17 gm  DAILY


 PO


   8/17/20 19:30


    8/17/20 22:45





 


 Psyllium


 Hydrophilic


 Mucilloid


  (Metamucil Fiber


 Packet)  1 pkt  QHS


 PO


   8/17/20 21:00


    8/17/20 22:45





 


 Amiodarone HCl


 450 mg/Dextrose  259 ml @ 


 17 mls/hr  1X  ONCE


 IV


   8/18/20 04:00


 8/18/20 19:14  8/18/20 06:08





 


 Lidocaine HCl


  (Xylocaine 2%


 Topical 30gm Tube)  1 kendra  1X  ONCE


 TP


   8/18/20 08:45


 8/18/20 08:46 DC 8/18/20 10:19





 


 Lidocaine HCl


  (Viscous


 Lidocaine)  15 ml  1X  ONCE


 SWSW


   8/18/20 08:45


 8/18/20 08:46 DC 8/18/20 10:17





 


 Benzocaine


  (Hurricaine One)  2 spray  1X  ONCE


 MM


   8/18/20 08:45


 8/18/20 08:46 DC 8/18/20 10:15














Justicifation of Admission Dx:


Justifications for Admission:


Justification of Admission Dx:  Yes











ARUN GARDINER III DO           Aug 18, 2020 12:53

## 2020-08-19 VITALS — DIASTOLIC BLOOD PRESSURE: 73 MMHG | SYSTOLIC BLOOD PRESSURE: 124 MMHG

## 2020-08-19 VITALS — DIASTOLIC BLOOD PRESSURE: 78 MMHG | SYSTOLIC BLOOD PRESSURE: 135 MMHG

## 2020-08-19 VITALS — SYSTOLIC BLOOD PRESSURE: 128 MMHG | DIASTOLIC BLOOD PRESSURE: 69 MMHG

## 2020-08-19 VITALS — DIASTOLIC BLOOD PRESSURE: 77 MMHG | SYSTOLIC BLOOD PRESSURE: 136 MMHG

## 2020-08-19 VITALS — DIASTOLIC BLOOD PRESSURE: 74 MMHG | SYSTOLIC BLOOD PRESSURE: 136 MMHG

## 2020-08-19 VITALS — SYSTOLIC BLOOD PRESSURE: 153 MMHG | DIASTOLIC BLOOD PRESSURE: 86 MMHG

## 2020-08-19 LAB
ANION GAP SERPL CALC-SCNC: 8 MMOL/L (ref 6–14)
BASOPHILS # BLD AUTO: 0.1 X10^3/UL (ref 0–0.2)
BASOPHILS NFR BLD: 1 % (ref 0–3)
BUN SERPL-MCNC: 10 MG/DL (ref 7–20)
CALCIUM SERPL-MCNC: 9.2 MG/DL (ref 8.5–10.1)
CHLORIDE SERPL-SCNC: 102 MMOL/L (ref 98–107)
CO2 SERPL-SCNC: 30 MMOL/L (ref 21–32)
CREAT SERPL-MCNC: 0.9 MG/DL (ref 0.6–1)
EOSINOPHIL NFR BLD: 0.4 X10^3/UL (ref 0–0.7)
EOSINOPHIL NFR BLD: 5 % (ref 0–3)
ERYTHROCYTE [DISTWIDTH] IN BLOOD BY AUTOMATED COUNT: 22.7 % (ref 11.5–14.5)
GFR SERPLBLD BASED ON 1.73 SQ M-ARVRAT: 61.9 ML/MIN
GLUCOSE SERPL-MCNC: 107 MG/DL (ref 70–99)
HCT VFR BLD CALC: 27.2 % (ref 36–47)
HGB BLD-MCNC: 8.5 G/DL (ref 12–15.5)
LYMPHOCYTES # BLD: 1.5 X10^3/UL (ref 1–4.8)
LYMPHOCYTES NFR BLD AUTO: 18 % (ref 24–48)
MCH RBC QN AUTO: 21 PG (ref 25–35)
MCHC RBC AUTO-ENTMCNC: 31 G/DL (ref 31–37)
MCV RBC AUTO: 67 FL (ref 79–100)
MONO #: 0.4 X10^3/UL (ref 0–1.1)
MONOCYTES NFR BLD: 5 % (ref 0–9)
NEUT #: 5.9 X10^3/UL (ref 1.8–7.7)
NEUTROPHILS NFR BLD AUTO: 72 % (ref 31–73)
PLATELET # BLD AUTO: 495 X10^3/UL (ref 140–400)
POTASSIUM SERPL-SCNC: 4.1 MMOL/L (ref 3.5–5.1)
RBC # BLD AUTO: 4.04 X10^6/UL (ref 3.5–5.4)
SODIUM SERPL-SCNC: 140 MMOL/L (ref 136–145)
WBC # BLD AUTO: 8.3 X10^3/UL (ref 4–11)

## 2020-08-19 RX ADMIN — IPRATROPIUM BROMIDE AND ALBUTEROL SCH PUFF: 20; 100 SPRAY, METERED RESPIRATORY (INHALATION) at 20:48

## 2020-08-19 RX ADMIN — Medication SCH EA: at 09:14

## 2020-08-19 RX ADMIN — IPRATROPIUM BROMIDE AND ALBUTEROL SCH PUFF: 20; 100 SPRAY, METERED RESPIRATORY (INHALATION) at 12:00

## 2020-08-19 RX ADMIN — APIXABAN SCH MG: 5 TABLET, FILM COATED ORAL at 09:15

## 2020-08-19 RX ADMIN — PSYLLIUM HUSK SCH PKT: 3.4 POWDER ORAL at 20:48

## 2020-08-19 RX ADMIN — Medication SCH CAP: at 20:47

## 2020-08-19 RX ADMIN — BUDESONIDE SCH MG: 0.5 INHALANT RESPIRATORY (INHALATION) at 19:47

## 2020-08-19 RX ADMIN — POLYETHYLENE GLYCOL 3350 SCH GM: 17 POWDER, FOR SOLUTION ORAL at 09:00

## 2020-08-19 RX ADMIN — IPRATROPIUM BROMIDE AND ALBUTEROL SCH PUFF: 20; 100 SPRAY, METERED RESPIRATORY (INHALATION) at 09:16

## 2020-08-19 RX ADMIN — FAMOTIDINE SCH MG: 20 TABLET ORAL at 20:47

## 2020-08-19 RX ADMIN — Medication PRN EACH: at 10:31

## 2020-08-19 RX ADMIN — APIXABAN SCH MG: 5 TABLET, FILM COATED ORAL at 20:47

## 2020-08-19 RX ADMIN — IPRATROPIUM BROMIDE AND ALBUTEROL SCH PUFF: 20; 100 SPRAY, METERED RESPIRATORY (INHALATION) at 16:00

## 2020-08-19 RX ADMIN — CYCLOBENZAPRINE HYDROCHLORIDE SCH MG: 10 TABLET, FILM COATED ORAL at 09:16

## 2020-08-19 RX ADMIN — Medication SCH CAP: at 09:15

## 2020-08-19 RX ADMIN — BUDESONIDE SCH MG: 0.5 INHALANT RESPIRATORY (INHALATION) at 07:33

## 2020-08-19 RX ADMIN — METOPROLOL TARTRATE SCH MG: 50 TABLET, FILM COATED ORAL at 20:47

## 2020-08-19 RX ADMIN — METOPROLOL TARTRATE SCH MG: 50 TABLET, FILM COATED ORAL at 09:16

## 2020-08-19 RX ADMIN — LEVOTHYROXINE SODIUM SCH MCG: 125 TABLET ORAL at 06:00

## 2020-08-19 RX ADMIN — AMIODARONE HYDROCHLORIDE SCH MG: 200 TABLET ORAL at 09:15

## 2020-08-19 RX ADMIN — FAMOTIDINE SCH MG: 20 TABLET ORAL at 09:14

## 2020-08-19 NOTE — NUR
SS following up with discharge planning. SS reviewed pt chart and discussed with pt RN. Pt 
is currently requiring oxygen. PT/OT ordered. Six minute walk ordered. Pt has no home 
oxygen. Pt on Eliquis. SS will continue to follow for discharge planning.

## 2020-08-19 NOTE — PDOC
PROGRESS NOTES


Date of Service:


DATE: 8/19/20 


TIME: 15:20





Chief Complaint


Chief Complaint





Acute hypoxic respiratory failure with worsening hypoxia secondary to saddle 

pulmonary embolism


Markedly increased troponin level secondary to severe right ventricular 

infarction and ischemia


Atrial fibrillation with rapid ventricular response induced by PE  


moderate tricuspid regurgitation with an estimated PAP of 71 mmHg. c/w severe 

pulm hypertension 


Status post cardiac cath yesterday no new stents placed


Going for defibrillation today


Hypertension


Hyperlipidemia 


Lumbar radiculopathy with significant decreased mobility with recent 

nontraumatic fall


Hypothyroidism


Microcytic anemia





History of Present Illness


History of Present Illness


8/19/2020


No acute events reported overnight, case discussed with nursing staff patient in

no acute distress no complaints during my visit





8/18/2020


Patient seen and examined


She is going for defibrillation this morning


Chart reviewed


Discussed with RN


Discussed with case management


Cardiac cath yesterday without stents











8/17/2020


Patient seen and examined


Resting with NAD


Awaiting Cardiac Cath Lab to r/o Coronary Artery Disease


If negative cath, hope to change anticoagulant to PO


Discussed with RN


Reviewed chart.











8/15/2020


Patient seen and examined


She just completed another repeat angiography of her chest


I reviewed the films the saddle emboli seems to have resolved quite a bit by my 

eye (awaiting official report from radiologist)


Discussed with RN


Chart reviewed











08/14/2020


Patient seen and examined in ICU 


Patient is off BiPAP and down to 5L O2 per NC  


She received tPA yesterday, now on heparin drip 


COVID negative 


Discussed with RN 


Chart reviewed 








08/13/20


Patient seen and examined in the ICU 


Hypoxia is worsening and she is requiring 100% oxygen on BiPAP


Troponin increased to 31, she has begun tPA per Dr. Patel 


Echo this AM showed normal LV wall motion, EF 50%, RV dilation secondary to 

tricuspid regurgitation 


Awaiting COVID results


Discussed with RN 


Chart reviewed





Vitals


Vitals





Vital Signs








  Date Time  Temp Pulse Resp B/P (MAP) Pulse Ox O2 Delivery O2 Flow Rate FiO2


 


8/19/20 11:00 97.5 97  124/73 (90) 99 Nasal Cannula 2.0 





 97.5       


 


8/18/20 19:00   18     











Physical Exam


General:  Alert, Oriented X3, Cooperative, No acute distress


Heart:  Regular rate, Other (AFIB RVR)


Lungs:  Clear


Abdomen:  Normal bowel sounds, Soft, No tenderness


Extremities:  No clubbing, No cyanosis


Skin:  No rashes, No breakdown, No significant lesion





Labs


LABS





Laboratory Tests








Test


 8/19/20


04:35


 


White Blood Count


 8.3 x10^3/uL


(4.0-11.0)


 


Red Blood Count


 4.04 x10^6/uL


(3.50-5.40)


 


Hemoglobin


 8.5 g/dL


(12.0-15.5)


 


Hematocrit


 27.2 %


(36.0-47.0)


 


Mean Corpuscular Volume 67 fL () 


 


Mean Corpuscular Hemoglobin 21 pg (25-35) 


 


Mean Corpuscular Hemoglobin


Concent 31 g/dL


(31-37)


 


Red Cell Distribution Width


 22.7 %


(11.5-14.5)


 


Platelet Count


 495 x10^3/uL


(140-400)


 


Neutrophils (%) (Auto) 72 % (31-73) 


 


Lymphocytes (%) (Auto) 18 % (24-48) 


 


Monocytes (%) (Auto) 5 % (0-9) 


 


Eosinophils (%) (Auto) 5 % (0-3) 


 


Basophils (%) (Auto) 1 % (0-3) 


 


Neutrophils # (Auto)


 5.9 x10^3/uL


(1.8-7.7)


 


Lymphocytes # (Auto)


 1.5 x10^3/uL


(1.0-4.8)


 


Monocytes # (Auto)


 0.4 x10^3/uL


(0.0-1.1)


 


Eosinophils # (Auto)


 0.4 x10^3/uL


(0.0-0.7)


 


Basophils # (Auto)


 0.1 x10^3/uL


(0.0-0.2)


 


Sodium Level


 140 mmol/L


(136-145)


 


Potassium Level


 4.1 mmol/L


(3.5-5.1)


 


Chloride Level


 102 mmol/L


()


 


Carbon Dioxide Level


 30 mmol/L


(21-32)


 


Anion Gap 8 (6-14) 


 


Blood Urea Nitrogen


 10 mg/dL


(7-20)


 


Creatinine


 0.9 mg/dL


(0.6-1.0)


 


Estimated GFR


(Cockcroft-Gault) 61.9 





 


Glucose Level


 107 mg/dL


(70-99)


 


Calcium Level


 9.2 mg/dL


(8.5-10.1)











Assessment and Plan


Assessmemt and Plan


Problems


Medical Problems:


(1) Atrial fibrillation with RVR


Status: Acute  





(2) Chest pain


Status: Acute  





(3) Saddle embolism of pulmonary artery


Status: Acute  





(4) Suspected COVID-19 virus infection


Status: Acute  











Comment


Review of Relevant


I have reviewed the following items donald (where applicable) has been applied.


Labs





Laboratory Tests








Test


 8/18/20


05:42 8/19/20


04:35


 


White Blood Count


 6.8 x10^3/uL


(4.0-11.0) 8.3 x10^3/uL


(4.0-11.0)


 


Red Blood Count


 3.92 x10^6/uL


(3.50-5.40) 4.04 x10^6/uL


(3.50-5.40)


 


Hemoglobin


 8.4 g/dL


(12.0-15.5) 8.5 g/dL


(12.0-15.5)


 


Hematocrit


 26.3 %


(36.0-47.0) 27.2 %


(36.0-47.0)


 


Mean Corpuscular Volume 67 fL ()  67 fL () 


 


Mean Corpuscular Hemoglobin 22 pg (25-35)  21 pg (25-35) 


 


Mean Corpuscular Hemoglobin


Concent 32 g/dL


(31-37) 31 g/dL


(31-37)


 


Red Cell Distribution Width


 22.8 %


(11.5-14.5) 22.7 %


(11.5-14.5)


 


Platelet Count


 442 x10^3/uL


(140-400) 495 x10^3/uL


(140-400)


 


Neutrophils (%) (Auto) 69 % (31-73)  72 % (31-73) 


 


Lymphocytes (%) (Auto) 18 % (24-48)  18 % (24-48) 


 


Monocytes (%) (Auto) 8 % (0-9)  5 % (0-9) 


 


Eosinophils (%) (Auto) 5 % (0-3)  5 % (0-3) 


 


Basophils (%) (Auto) 1 % (0-3)  1 % (0-3) 


 


Neutrophils # (Auto)


 4.6 x10^3/uL


(1.8-7.7) 5.9 x10^3/uL


(1.8-7.7)


 


Lymphocytes # (Auto)


 1.2 x10^3/uL


(1.0-4.8) 1.5 x10^3/uL


(1.0-4.8)


 


Monocytes # (Auto)


 0.5 x10^3/uL


(0.0-1.1) 0.4 x10^3/uL


(0.0-1.1)


 


Eosinophils # (Auto)


 0.3 x10^3/uL


(0.0-0.7) 0.4 x10^3/uL


(0.0-0.7)


 


Basophils # (Auto)


 0.1 x10^3/uL


(0.0-0.2) 0.1 x10^3/uL


(0.0-0.2)


 


Sodium Level


 139 mmol/L


(136-145) 140 mmol/L


(136-145)


 


Potassium Level


 4.0 mmol/L


(3.5-5.1) 4.1 mmol/L


(3.5-5.1)


 


Chloride Level


 103 mmol/L


() 102 mmol/L


()


 


Carbon Dioxide Level


 28 mmol/L


(21-32) 30 mmol/L


(21-32)


 


Anion Gap 8 (6-14)  8 (6-14) 


 


Blood Urea Nitrogen


 8 mg/dL (7-20) 


 10 mg/dL


(7-20)


 


Creatinine


 0.7 mg/dL


(0.6-1.0) 0.9 mg/dL


(0.6-1.0)


 


Estimated GFR


(Cockcroft-Gault) 82.7 


 61.9 





 


Glucose Level


 127 mg/dL


(70-99) 107 mg/dL


(70-99)


 


Calcium Level


 9.2 mg/dL


(8.5-10.1) 9.2 mg/dL


(8.5-10.1)








Laboratory Tests








Test


 8/19/20


04:35


 


White Blood Count


 8.3 x10^3/uL


(4.0-11.0)


 


Red Blood Count


 4.04 x10^6/uL


(3.50-5.40)


 


Hemoglobin


 8.5 g/dL


(12.0-15.5)


 


Hematocrit


 27.2 %


(36.0-47.0)


 


Mean Corpuscular Volume 67 fL () 


 


Mean Corpuscular Hemoglobin 21 pg (25-35) 


 


Mean Corpuscular Hemoglobin


Concent 31 g/dL


(31-37)


 


Red Cell Distribution Width


 22.7 %


(11.5-14.5)


 


Platelet Count


 495 x10^3/uL


(140-400)


 


Neutrophils (%) (Auto) 72 % (31-73) 


 


Lymphocytes (%) (Auto) 18 % (24-48) 


 


Monocytes (%) (Auto) 5 % (0-9) 


 


Eosinophils (%) (Auto) 5 % (0-3) 


 


Basophils (%) (Auto) 1 % (0-3) 


 


Neutrophils # (Auto)


 5.9 x10^3/uL


(1.8-7.7)


 


Lymphocytes # (Auto)


 1.5 x10^3/uL


(1.0-4.8)


 


Monocytes # (Auto)


 0.4 x10^3/uL


(0.0-1.1)


 


Eosinophils # (Auto)


 0.4 x10^3/uL


(0.0-0.7)


 


Basophils # (Auto)


 0.1 x10^3/uL


(0.0-0.2)


 


Sodium Level


 140 mmol/L


(136-145)


 


Potassium Level


 4.1 mmol/L


(3.5-5.1)


 


Chloride Level


 102 mmol/L


()


 


Carbon Dioxide Level


 30 mmol/L


(21-32)


 


Anion Gap 8 (6-14) 


 


Blood Urea Nitrogen


 10 mg/dL


(7-20)


 


Creatinine


 0.9 mg/dL


(0.6-1.0)


 


Estimated GFR


(Cockcroft-Gault) 61.9 





 


Glucose Level


 107 mg/dL


(70-99)


 


Calcium Level


 9.2 mg/dL


(8.5-10.1)








Microbiology


8/13/20 Urine Culture - Final, Complete


          


8/12/20 Blood Culture - Final, Complete


          NO GROWTH AFTER 5 DAYS


Medications





Current Medications


Sodium Chloride 1,000 ml @  1,000 mls/hr 1X  ONCE IV  Last administered on 

8/12/20at 12:09;  Start 8/12/20 at 11:45;  Stop 8/12/20 at 12:44;  Status DC


Ceftriaxone Sodium (Rocephin) 1 gm 1X  ONCE IVP  Last administered on 8/12/20at 

12:07;  Start 8/12/20 at 11:45;  Stop 8/12/20 at 11:47;  Status DC


Iohexol (Omnipaque 350 Mg/ml) 90 ml 1X  ONCE IV ;  Start 8/12/20 at 13:00;  Stop

8/12/20 at 13:01;  Status DC


Metoprolol Tartrate (Lopressor Vial) 5 mg 1X  ONCE IVP  Last administered on 

8/12/20at 13:03;  Start 8/12/20 at 13:15;  Stop 8/12/20 at 13:16;  Status DC


Heparin Sodium/ Dextrose 250 ml @ 0 mls/hr CONT  PRN IV PER PROTOCOL Last 

administered on 8/12/20at 13:47;  Start 8/12/20 at 13:30;  Stop 8/12/20 at 

14:19;  Status DC


Heparin Sodium (Porcine) (Heparin Sodium) 2,150 unit PRN Q6HRS  PRN IV FOR UFH 

LEVEL LESS THAN 0.2 Last administered on 8/12/20at 13:45;  Start 8/12/20 at 

13:30;  Stop 8/12/20 at 14:19;  Status DC


Ondansetron HCl (Zofran) 4 mg PRN Q8HRS  PRN IV NAUSEA/VOMITING Last 

administered on 8/12/20at 17:31;  Start 8/12/20 at 13:45;  Stop 8/13/20 at 

13:44;  Status DC


Heparin Sodium/ Dextrose 250 ml @ 0 mls/hr CONT  PRN IV PER PROTOCOL Last 

administered on 8/16/20at 14:36;  Start 8/12/20 at 14:15;  Stop 8/17/20 at 

12:31;  Status DC


Heparin Sodium (Porcine) (Heparin Sodium) 2,600 unit PRN Q6HRS  PRN IV FOR UFH 

LEVEL LESS THAN 0.2;  Start 8/12/20 at 14:15;  Stop 8/17/20 at 12:31;  Status DC


Heparin Sodium (Porcine) (Heparin Sodium) 1,300 unit PRN Q6HRS  PRN IV FOR UFH 

LEVEL 0.2 - 0.29;  Start 8/12/20 at 14:15;  Stop 8/17/20 at 12:31;  Status DC


Aspirin (Ecotrin) 325 mg 1X  ONCE PO ;  Start 8/12/20 at 14:45;  Stop 8/12/20 at

14:46;  Status DC


Metoprolol Succinate (Toprol Xl) 25 mg BID PO ;  Start 8/12/20 at 14:30;  Stop 

8/12/20 at 15:49;  Status DC


Metoprolol Tartrate (Lopressor Vial) 5 mg 1X  ONCE IVP  Last administered on 

8/12/20at 14:52;  Start 8/12/20 at 14:45;  Stop 8/12/20 at 14:46;  Status DC


Metoprolol Tartrate (Lopressor Vial) 5 mg 1X  ONCE IVP ;  Start 8/12/20 at 

14:45;  Stop 8/12/20 at 14:46;  Status Cancel


Digoxin (Lanoxin) 500 mcg 1X  ONCE IV  Last administered on 8/12/20at 15:59;  

Start 8/12/20 at 16:15;  Stop 8/12/20 at 16:16;  Status DC


Diltiazem HCl 125 mg/Sodium Chloride 125 ml @ 5 mls/hr CONT  PRN IV SEE I/O 

RECORD Last administered on 8/14/20at 03:24;  Start 8/12/20 at 16:00;  Stop 

8/14/20 at 09:15;  Status DC


Cyclobenzaprine HCl (Flexeril) 10 mg TID PO  Last administered on 8/19/20at 

09:16;  Start 8/12/20 at 21:00;  Stop 8/19/20 at 14:29;  Status DC


Levothyroxine Sodium (Synthroid) 125 mcg DAILY06 PO  Last administered on 8/18/2

0at 06:08;  Start 8/13/20 at 06:00


Tramadol HCl (Ultram) 50 mg PRN DAILY  PRN PO PAIN Last administered on 

8/18/20at 22:10;  Start 8/12/20 at 18:45


Info (Anti-Coagulation Monitoring By Pharmacy) 1 each PRN DAILY  PRN MC SEE 

COMMENTS Last administered on 8/19/20at 10:31;  Start 8/13/20 at 08:30


Alteplase, Recombinant 100 ml @  50 mls/hr 1X  ONCE IV  Last administered on 

8/13/20at 09:22;  Start 8/13/20 at 09:00;  Stop 8/13/20 at 10:59;  Status DC


Alprazolam (Xanax) 0.25 mg PRN Q6HRS  PRN PO ANXIETY / AGITATION Last 

administered on 8/17/20at 22:45;  Start 8/13/20 at 15:30


Famotidine (Pepcid) 20 mg PRN BID  PRN PO HEARTBURN / GAS Last administered on 

8/15/20at 04:19;  Start 8/13/20 at 23:15;  Stop 8/15/20 at 05:29;  Status DC


Budesonide (Pulmicort) 0.5 mg RTBID NEB  Last administered on 8/19/20at 07:33;  

Start 8/14/20 at 09:00


Levalbuterol HCl (Xopenex) 1.25 mg PRN Q4HRS  PRN NEB SHORTNESS OF BREATH Last 

administered on 8/14/20at 20:05;  Start 8/14/20 at 09:00;  Stop 8/15/20 at 

18:33;  Status DC


Diltiazem HCl (Cardizem 24hr Cd) 240 mg DAILY PO  Last administered on 8/17/20at

08:38;  Start 8/14/20 at 09:15;  Stop 8/18/20 at 12:06;  Status DC


Famotidine (Pepcid) 20 mg BID PO  Last administered on 8/19/20at 09:14;  Start 

8/15/20 at 09:00


Iohexol (Omnipaque 350 Mg/ml) 100 ml 1X  ONCE IV  Last administered on 8/15/20at

10:30;  Start 8/15/20 at 08:00;  Stop 8/15/20 at 08:01;  Status DC


Ceftriaxone Sodium (Rocephin) 1 gm Q24H IVP  Last administered on 8/18/20at 

14:15;  Start 8/15/20 at 14:00;  Stop 8/19/20 at 09:21;  Status DC


Albuterol/ Ipratropium (Combivent Respimat  Mcg) 1 puff RTQID INH  Last 

administered on 8/19/20at 12:00;  Start 8/15/20 at 20:00


Lactobacillus Rhamnosus (Culturelle) 1 cap BID PO  Last administered on 

8/19/20at 09:15;  Start 8/16/20 at 21:00


Potassium Chloride (Klor-Con) 60 meq 1X  ONCE PO  Last administered on 8/16/20at

14:52;  Start 8/16/20 at 15:00;  Stop 8/16/20 at 15:01;  Status DC


Midazolam HCl (Versed) 5 mg STK-MED ONCE .ROUTE ;  Start 8/17/20 at 10:17;  Stop

8/17/20 at 10:17;  Status DC


Fentanyl Citrate (Fentanyl 2ml Vial) 100 mcg STK-MED ONCE .ROUTE ;  Start 

8/17/20 at 10:17;  Stop 8/17/20 at 10:18;  Status DC


Verapamil HCl (Verapamil) 5 mg STK-MED ONCE .ROUTE ;  Start 8/17/20 at 10:18;  

Stop 8/17/20 at 10:18;  Status DC


Heparin Sodium (Porcine) (Heparin Sodium) 10,000 unit STK-MED ONCE .ROUTE ;  

Start 8/17/20 at 10:18;  Stop 8/17/20 at 10:18;  Status DC


Nitroglycerin (Nitroglycerin) 200 mcg STK-MED ONCE .ROUTE ;  Start 8/17/20 at 

10:18;  Stop 8/17/20 at 10:18;  Status DC


Lidocaine HCl (Xylocaine-Mpf 1% 2ml Vial) 2 ml STK-MED ONCE .ROUTE ;  Start 

8/17/20 at 10:21;  Stop 8/17/20 at 10:21;  Status DC


Iohexol (Omnipaque 300 Mg/ml) 100 ml STK-MED ONCE .ROUTE ;  Start 8/17/20 at 

10:21;  Stop 8/17/20 at 10:22;  Status DC


Heparin Sodium/ Sodium Chloride 1,000 ml @  As Directed STK-MED ONCE .ROUTE ;  

Start 8/17/20 at 10:21;  Stop 8/17/20 at 10:22;  Status DC


Nitroglycerin (Nitroglycerin) 200 mcg 1X  ONCE IART  Last administered on 

8/17/20at 11:10;  Start 8/17/20 at 11:00;  Stop 8/17/20 at 11:03;  Status DC


Verapamil HCl (Verapamil) 2.5 mg 1X  ONCE IART  Last administered on 8/17/20at 

11:11;  Start 8/17/20 at 11:00;  Stop 8/17/20 at 11:03;  Status DC


Heparin Sodium (Porcine) (Heparin Sodium) 2,500 unit 1X  ONCE IART  Last 

administered on 8/17/20at 11:12;  Start 8/17/20 at 11:00;  Stop 8/17/20 at 

11:03;  Status DC


Heparin Sodium/ Sodium Chloride (HEPARIN for ARTERIAL LINE FLUSH) 1,000 unit 1X 

ONCE IART  Last administered on 8/17/20at 11:09;  Start 8/17/20 at 11:00;  Stop 

8/17/20 at 11:03;  Status DC


Midazolam HCl (Versed) 5 mg 1X  ONCE IV  Last administered on 8/17/20at 11:12;  

Start 8/17/20 at 11:00;  Stop 8/17/20 at 11:03;  Status DC


Fentanyl Citrate (Fentanyl 2ml Vial) 100 mcg 1X  ONCE IV  Last administered on 

8/17/20at 11:11;  Start 8/17/20 at 11:00;  Stop 8/17/20 at 11:03;  Status DC


Iohexol (Omnipaque 300 Mg/ml) 100 ml 1X  ONCE IART  Last administered on 

8/17/20at 11:10;  Start 8/17/20 at 11:00;  Stop 8/17/20 at 11:03;  Status DC


Lidocaine HCl (Xylocaine-Mpf 1% 2ml Vial) 2 ml 1X  ONCE INJ  Last administered 

on 8/17/20at 11:10;  Start 8/17/20 at 11:00;  Stop 8/17/20 at 11:03;  Status DC


Sodium Chloride 1,000 ml @  60 mls/hr [id number] IV  Last administered on 8/18/20at

06:10;  Start 8/17/20 at 12:22;  Stop 8/19/20 at 14:29;  Status DC


Nitroglycerin (Nitrostat) 0.4 mg PRN Q5MIN  PRN SL CHEST PAIN;  Start 8/17/20 at

12:30


Apixaban (Eliquis) 10 mg BID PO  Last administered on 8/19/20at 09:15;  Start 

8/17/20 at 13:00;  Stop 8/23/20 at 21:01


Apixaban (Eliquis) 5 mg BID PO ;  Start 8/24/20 at 09:00


Non-Formulary Medication 1 ea DAILY PO  Last administered on 8/19/20at 09:14;  

Start 8/17/20 at 14:00


Digoxin (Lanoxin) 500 mcg 1X  ONCE IV  Last administered on 8/17/20at 18:36;  

Start 8/17/20 at 18:15;  Stop 8/17/20 at 18:20;  Status DC


Amiodarone HCl 150 mg/Dextrose 103 ml @  618 mls/hr 1X  ONCE IV  Last 

administered on 8/17/20at 18:48;  Start 8/17/20 at 18:15;  Stop 8/17/20 at 

18:24;  Status DC


Amiodarone HCl 450 mg/Dextrose 259 ml @ 0 mls/hr 1X  ONCE IV  Last administered 

on 8/17/20at 18:15;  Start 8/17/20 at 18:15;  Stop 8/17/20 at 18:20;  Status DC


Polyethylene Glycol (miraLAX PACKET) 17 gm DAILY PO  Last administered on 

8/17/20at 22:45;  Start 8/17/20 at 19:30


Senna/Docusate Sodium (Senna Plus) 1 tab PRN BID  PRN PO CONSTIPATION;  Start 

8/17/20 at 19:15;  Status Cancel


Psyllium Hydrophilic Mucilloid (Metamucil Fiber Packet) 1 pkt QHS PO  Last 

administered on 8/18/20at 22:05;  Start 8/17/20 at 21:00


Magnesium Hydroxide (Milk Of Magnesia) 2,400 mg PRN DAILY  PRN PO CONSTIPATION; 

Start 8/17/20 at 19:30


Amiodarone HCl 450 mg/Dextrose 259 ml @  17 mls/hr 1X  ONCE IV  Last 

administered on 8/18/20at 06:08;  Start 8/18/20 at 04:00;  Stop 8/18/20 at 

19:14;  Status DC


Sodium Chloride (Normal Saline Flush) 10 ml QSHIFT  PRN IV AFTER MEDS AND BLOOD 

DRAWS;  Start 8/18/20 at 08:45


Lidocaine HCl (Xylocaine 2% Topical 30gm Tube) 1 kendra 1X  ONCE TP  Last 

administered on 8/18/20at 10:19;  Start 8/18/20 at 08:45;  Stop 8/18/20 at 

08:46;  Status DC


Lidocaine HCl (Viscous Lidocaine) 15 ml 1X  ONCE SWSW  Last administered on 

8/18/20at 10:17;  Start 8/18/20 at 08:45;  Stop 8/18/20 at 08:46;  Status DC


Benzocaine (Hurricaine One) 2 spray 1X  ONCE MM  Last administered on 8/18/20at 

10:15;  Start 8/18/20 at 08:45;  Stop 8/18/20 at 08:46;  Status DC


Magnesium Citrate (Citroma) 296 ml PRN 1X  PRN PO CONSTIPATION;  Start 8/18/20 

at 09:45


Propofol (Diprivan) 200 mg STK-MED ONCE IV ;  Start 8/18/20 at 09:59;  Stop 

8/18/20 at 10:00;  Status DC


Lidocaine HCl (Lidocaine Pf 2% Vial) 5 ml STK-MED ONCE .ROUTE ;  Start 8/18/20 

at 09:59;  Stop 8/18/20 at 10:00;  Status DC


Metoprolol Tartrate (Lopressor) 50 mg BID PO  Last administered on 8/19/20at 

09:16;  Start 8/18/20 at 21:00


Amiodarone HCl (Cordarone) 200 mg DAILY PO  Last administered on 8/19/20at 

09:15;  Start 8/18/20 at 12:15


Metoprolol Tartrate (Lopressor) 50 mg 1X  ONCE PO  Last administered on 

8/18/20at 12:50;  Start 8/18/20 at 12:45;  Stop 8/18/20 at 12:46;  Status DC


Calcium Carbonate/ Glycine (Tums) 500 mg PRN AFTMEALHC  PRN PO INDIGESTION Last 

administered on 8/18/20at 16:38;  Start 8/18/20 at 16:45


Aspirin (Ecotrin) 81 mg DAILYWBKFT PO ;  Start 8/20/20 at 08:00


Lidocaine HCl (Buffered Lidocaine 1%) 3 ml 1X  ONCE INJ ;  Start 8/19/20 at 

15:30;  Stop 8/19/20 at 15:31;  Status UNV





Active Scripts


Active


Reported


Cetirizine Hcl 10 Mg Tablet 1 Tab PO DAILY


Ditropan Xl (Oxybutynin Chloride) 10 Mg Tab.er.24 10 Mg PO DAILY


Zoloft (Sertraline Hcl) 100 Mg Tablet 100 Mg PO DAILY


Ventolin Hfa Inhaler (Albuterol Sulfate) 18 Gm Hfa.aer.ad 2 Puff INH QID


Advair 100-50 Diskus (Fluticasone/Salmeterol) 1 Each Disk.w.dev 1 Inh IH BID


Xanax (Alprazolam) 0.25 Mg Tablet 0.25 Mg PO PRN Q6HRS PRN


Cyclobenzaprine Hcl 10 Mg Tablet 10 Mg PO TID


Levothyroxine Sodium 125 Mcg Tablet 125 Mcg PO DAILYAC


Ranitidine Hcl 300 Mg Capsule 300 Mg PO DAILY


Tramadol Hcl 50 Mg Tablet 50 Mg PO DAILY PRN


Bystolic (Nebivolol) 5 Mg Tablet 5 Mg PO DAILY


Vitals/I & O





Vital Sign - Last 24 Hours








 8/18/20 8/18/20 8/18/20 8/18/20





 19:00 19:35 20:00 22:10


 


Temp 97.8   





 97.8   


 


Pulse 83   83


 


Resp 18   


 


B/P (MAP) 131/70 (90)   131/70


 


Pulse Ox 97 96  


 


O2 Delivery Nasal Cannula Nasal Cannula Nasal Cannula 


 


O2 Flow Rate 3.0 3.0 2.0 


 


    





    





 8/18/20 8/19/20 8/19/20 8/19/20





 23:00 03:00 07:00 07:35


 


Temp 98.0 96.6 97.5 





 98.0 96.6 97.5 


 


Pulse 80 80 75 


 


B/P (MAP) 140/75 (96) 135/78 (97) 136/77 (96) 


 


Pulse Ox 100 99 99 98


 


O2 Delivery Nasal Cannula Nasal Cannula Nasal Cannula Nasal Cannula


 


O2 Flow Rate 2.0 2.0 2.0 3.0


 


    





    





 8/19/20 8/19/20 8/19/20 8/19/20





 08:00 09:15 09:16 11:00


 


Temp    97.5





    97.5


 


Pulse  82 82 97


 


B/P (MAP)  136/77 136/77 124/73 (90)


 


Pulse Ox    99


 


O2 Delivery Nasal Cannula   Nasal Cannula


 


O2 Flow Rate 1.0   2.0














Intake and Output   


 


 8/18/20 8/18/20 8/19/20





 15:00 23:00 07:00


 


Intake Total   1000 ml


 


Output Total 1900 ml  600 ml


 


Balance -1900 ml  400 ml











Justicifation of Admission Dx:


Justifications for Admission:


Justification of Admission Dx:  Yes











CALI LOOMIS MD             Aug 19, 2020 15:21

## 2020-08-19 NOTE — PDOC
PULMONARY PROGRESS NOTES


DATE: 8/19/20 


TIME: 09:18


Subjective


sob is better, cough sputum better  no cp


Pt. is S/P systemic TPA on 8/13/2020, now on N/C oxygen, 2 lpm


Vitals





Vital Signs








  Date Time  Temp Pulse Resp B/P (MAP) Pulse Ox O2 Delivery O2 Flow Rate FiO2


 


8/19/20 07:35     98 Nasal Cannula 3.0 


 


8/18/20 23:00 98.0 80  140/75 (96)    





 98.0       


 


8/18/20 19:00   18     








ROS:  No Nausea, No Chest Pain, No Abdominal Pain, No Increase Cough


General:  Alert, No acute distress


Lungs:  Clear


Cardiovascular:  S1, S2


Abdomen:  Soft, Non-tender


Neuro Exam:  Alert


Extremities:  No Edema


Skin:  Warm


Labs





Laboratory Tests








Test


 8/18/20


05:42 8/19/20


04:35


 


White Blood Count


 6.8 x10^3/uL


(4.0-11.0) 8.3 x10^3/uL


(4.0-11.0)


 


Red Blood Count


 3.92 x10^6/uL


(3.50-5.40) 4.04 x10^6/uL


(3.50-5.40)


 


Hemoglobin


 8.4 g/dL


(12.0-15.5) 8.5 g/dL


(12.0-15.5)


 


Hematocrit


 26.3 %


(36.0-47.0) 27.2 %


(36.0-47.0)


 


Mean Corpuscular Volume 67 fL ()  67 fL () 


 


Mean Corpuscular Hemoglobin 22 pg (25-35)  21 pg (25-35) 


 


Mean Corpuscular Hemoglobin


Concent 32 g/dL


(31-37) 31 g/dL


(31-37)


 


Red Cell Distribution Width


 22.8 %


(11.5-14.5) 22.7 %


(11.5-14.5)


 


Platelet Count


 442 x10^3/uL


(140-400) 495 x10^3/uL


(140-400)


 


Neutrophils (%) (Auto) 69 % (31-73)  72 % (31-73) 


 


Lymphocytes (%) (Auto) 18 % (24-48)  18 % (24-48) 


 


Monocytes (%) (Auto) 8 % (0-9)  5 % (0-9) 


 


Eosinophils (%) (Auto) 5 % (0-3)  5 % (0-3) 


 


Basophils (%) (Auto) 1 % (0-3)  1 % (0-3) 


 


Neutrophils # (Auto)


 4.6 x10^3/uL


(1.8-7.7) 5.9 x10^3/uL


(1.8-7.7)


 


Lymphocytes # (Auto)


 1.2 x10^3/uL


(1.0-4.8) 1.5 x10^3/uL


(1.0-4.8)


 


Monocytes # (Auto)


 0.5 x10^3/uL


(0.0-1.1) 0.4 x10^3/uL


(0.0-1.1)


 


Eosinophils # (Auto)


 0.3 x10^3/uL


(0.0-0.7) 0.4 x10^3/uL


(0.0-0.7)


 


Basophils # (Auto)


 0.1 x10^3/uL


(0.0-0.2) 0.1 x10^3/uL


(0.0-0.2)


 


Sodium Level


 139 mmol/L


(136-145) 140 mmol/L


(136-145)


 


Potassium Level


 4.0 mmol/L


(3.5-5.1) 4.1 mmol/L


(3.5-5.1)


 


Chloride Level


 103 mmol/L


() 102 mmol/L


()


 


Carbon Dioxide Level


 28 mmol/L


(21-32) 30 mmol/L


(21-32)


 


Anion Gap 8 (6-14)  8 (6-14) 


 


Blood Urea Nitrogen


 8 mg/dL (7-20) 


 10 mg/dL


(7-20)


 


Creatinine


 0.7 mg/dL


(0.6-1.0) 0.9 mg/dL


(0.6-1.0)


 


Estimated GFR


(Cockcroft-Gault) 82.7 


 61.9 





 


Glucose Level


 127 mg/dL


(70-99) 107 mg/dL


(70-99)


 


Calcium Level


 9.2 mg/dL


(8.5-10.1) 9.2 mg/dL


(8.5-10.1)








Laboratory Tests








Test


 8/19/20


04:35


 


White Blood Count


 8.3 x10^3/uL


(4.0-11.0)


 


Red Blood Count


 4.04 x10^6/uL


(3.50-5.40)


 


Hemoglobin


 8.5 g/dL


(12.0-15.5)


 


Hematocrit


 27.2 %


(36.0-47.0)


 


Mean Corpuscular Volume 67 fL () 


 


Mean Corpuscular Hemoglobin 21 pg (25-35) 


 


Mean Corpuscular Hemoglobin


Concent 31 g/dL


(31-37)


 


Red Cell Distribution Width


 22.7 %


(11.5-14.5)


 


Platelet Count


 495 x10^3/uL


(140-400)


 


Neutrophils (%) (Auto) 72 % (31-73) 


 


Lymphocytes (%) (Auto) 18 % (24-48) 


 


Monocytes (%) (Auto) 5 % (0-9) 


 


Eosinophils (%) (Auto) 5 % (0-3) 


 


Basophils (%) (Auto) 1 % (0-3) 


 


Neutrophils # (Auto)


 5.9 x10^3/uL


(1.8-7.7)


 


Lymphocytes # (Auto)


 1.5 x10^3/uL


(1.0-4.8)


 


Monocytes # (Auto)


 0.4 x10^3/uL


(0.0-1.1)


 


Eosinophils # (Auto)


 0.4 x10^3/uL


(0.0-0.7)


 


Basophils # (Auto)


 0.1 x10^3/uL


(0.0-0.2)


 


Sodium Level


 140 mmol/L


(136-145)


 


Potassium Level


 4.1 mmol/L


(3.5-5.1)


 


Chloride Level


 102 mmol/L


()


 


Carbon Dioxide Level


 30 mmol/L


(21-32)


 


Anion Gap 8 (6-14) 


 


Blood Urea Nitrogen


 10 mg/dL


(7-20)


 


Creatinine


 0.9 mg/dL


(0.6-1.0)


 


Estimated GFR


(Cockcroft-Gault) 61.9 





 


Glucose Level


 107 mg/dL


(70-99)


 


Calcium Level


 9.2 mg/dL


(8.5-10.1)








Medications





Active Scripts








 Medications  Dose


 Route/Sig


 Max Daily Dose Days Date Category


 


 Cetirizine Hcl 10


 Mg Tablet  1 Tab


 PO DAILY


   8/13/20 Reported


 


 Ditropan Xl


  (Oxybutynin


 Chloride) 10 Mg


 Tab.er.24  10 Mg


 PO DAILY


   9/14/16 Reported


 


 Zoloft


  (Sertraline Hcl)


 100 Mg Tablet  100 Mg


 PO DAILY


   9/14/16 Reported


 


 Ventolin Hfa


 Inhaler


  (Albuterol


 Sulfate) 18 Gm


 Hfa.aer.ad  2 Puff


 INH QID


   9/14/16 Reported


 


 Advair 100-50


 Diskus


  (Fluticasone/Salmeterol)


 1 Each Disk.w.dev  1 Inh


 IH BID


   9/14/16 Reported


 


 Xanax


  (Alprazolam) 0.25


 Mg Tablet  0.25 Mg


 PO PRN Q6HRS PRN


   9/14/16 Reported


 


 Cyclobenzaprine


 Hcl 10 Mg Tablet  10 Mg


 PO TID


   9/14/16 Reported


 


 Levothyroxine


 Sodium 125 Mcg


 Tablet  125 Mcg


 PO DAILYAC


   9/14/16 Reported


 


 Ranitidine Hcl


 300 Mg Capsule  300 Mg


 PO DAILY


   9/14/16 Reported


 


 Tramadol Hcl 50


 Mg Tablet  50 Mg


 PO DAILY PRN


   9/14/16 Reported


 


 Bystolic


  (Nebivolol) 5 Mg


 Tablet  5 Mg


 PO DAILY


   9/14/16 Reported








Comments


cta 8/15/20 reviewed


Continued presence of multiple smaller bilateral pulmonary emboli but 


resolution of the saddle embolus seen previously. New patchy areas of 


groundglass infiltrates bilaterally especially peripherally in the left 


upper lobe. These may reflect areas of atelectasis but recommend clinical 


correlation and follow-up.





BLE U/S 


IMPRESSION:


*   Deep vein thrombosis seen on the left.





ECHIO 8/13/2020 


<Conclusion>


The left ventricular systolic function is normal and the ejection fraction is 

within normal range. The Ejection Fraction is 50%.


There is normal LV segmental wall motion.


The right ventricle is moderately to severly dilated measuring 5.2 cm.


Doppler and Color Flow revealed mild to moderate tricuspid regurgitation with an

estimated PAP of 71 mmHg.








PROCEDURE NARRATIVE


After explaining the risks, benefits and alternative options, informed consent 

was obtained from patient.  Patient was brought to the cardiac Cath Lab and 

right wrist was prepped and draped in the usual fashion after confirming a 

positive modified Justin's test.  Arterial access was obtained in the right 

radial artery and a 6 Macedonian sheath was inserted.  6 Macedonian Curtis catheter was 

used to perform selective angiography of the left and right coronary arteries.  

6 Macedonian pigtail catheter was used to perform left ventriculography.  Patient 

tolerated the procedure well.  Hemostasis was achieved using TR band.  There 

were no immediate complications.  The following findings were noted.





FINDINGS


1.  Hemodynamics: Elevated left ventricular end-diastolic pressure of 31 mmHg 

consistent with acute diastolic heart failure.  No pullback gradient across the 

aortic valve.


2.  Left ventriculography:  Inferoapical wall hypokinesis with ejection fraction

estimated at 55%.  No significant mitral regurgitation seen.


3.  Coronary angiography:


a.  The left main coronary artery arose from the left sinus of Valsalva, gave 

rise to the left anterior descending and left circumflex arteries and did not 

show any significant stenosis.


b.  The left anterior descending artery did not show any significant stenosis.


c.  The left circumflex artery did not show any significant stenosis.


d.  The right coronary artery was a large and dominant vessel arising from the 

right sinus of Valsalva that showed small amount of thrombus resulting in 80% 

stenosis severity in distal segment of posterior descending branch.





Conclusion


1.  Small amount of thrombus with 80% luminal obstruction in distal segment of 

posterior descending branch of right coronary artery.  No lesions needing 

intervention were noted.


2.  Inferoapical wall hypokinesis with ejection fraction estimated at 55%





Recommendations


Medical Therapy





Signed by : Aníbal Partida, 


Electronically Approved : 08/17/2020 12:09:08





Impression


.


IMPRESSION:


1.  Acute hypoxic respiratory failure secondary to acute massive pulmonary 

embolism .  Risk factor is likely related to recent immobility for


the last 2 weeks due to her lumbar radiculopathy and also from the fall when she


twisted her knee.  Underlying obesity would be another risk factor.  She has no


known cancers.  Not on any form of estrogens.-- s/p systemic TPA with sig 

improvement, off BIPAP, oxygenation improved.


2.  Atrial fibrillation with rapid ventricular response, likely caused by right 

ventricular strain., Rx with cardizem. Successful SAMANTHA guided cardioversion of 

atrial fibrillation to SR


3.  No significant tobacco history.


4.  No history of any hypercoagulable state.


5. LLE DVT 


6. Markedly elevated troponin (peak 31). Out of proportion to usually seen in RV

infarction. [facility] reviewed. Likely had MI, resolved with TPA


7.  Sig ASD, contributed to Right to left thrombus (MI)





Plan


.


RECOMMENDATIONS:


Continue supplemental oxygen to keep sats above 92 %, now on N/C 2 lpm


dc abx 


covid19 neg


pepcid bid for stress ulcer prophylaxis


S/P systemic TPA with sig improvement of hypoxia .On Eliquis


Repeat CTA chest, reviewed. much improved saddle emboli from central PA


Follow cardiology recommendations-- continued management of AFIB- s/p successful

cardioversion


cath reviewed.Small amount of thrombus with 80% luminal obstruction in distal 

segment of posterior descending branch of right coronary artery.  No lesions 

needing intervention were noted. MI improved with TPA


ECHO revied EF 50% 


BLE DPLX positive for LLE DVT 


NEBS


hypercoagulable panel as an outpatient.


3-6 months of AC


Right heart cath as OP to assess for R-Left shunt and need for ASD closure


d/w Dr Partida and daughter


Pt to f/u with me in Oct with dopplers/ CTA before








D/W RN, pt











UMU FRAZIER MD                 Aug 19, 2020 09:22

## 2020-08-19 NOTE — PDOC
MAGNUS MANZANARES APRN 8/19/20 1458:


CARDIO Progress Notes


Date and Time


Date of Service


8/19/2020


Time of Evaluation


1045





Subjective


Subjective:  No Chest Pain, No shortness of breath, No Palpitations





Vitals


Vitals





Vital Signs








  Date Time  Temp Pulse Resp B/P (MAP) Pulse Ox O2 Delivery O2 Flow Rate FiO2


 


8/19/20 11:00 97.5 97  124/73 (90) 99 Nasal Cannula 2.0 





 97.5       


 


8/18/20 19:00   18     








Weight


Weight [ ]





Input and Output


Intake and Output











Intake and Output 


 


 8/19/20





 07:00


 


Intake Total 1000 ml


 


Output Total 2500 ml


 


Balance -1500 ml


 


 


 


Intake Oral 1000 ml


 


Output Urine Total 2500 ml


 


# Voids 2











Laboratory


Labs





Laboratory Tests








Test


 8/19/20


04:35


 


White Blood Count


 8.3 x10^3/uL


(4.0-11.0)


 


Red Blood Count


 4.04 x10^6/uL


(3.50-5.40)


 


Hemoglobin


 8.5 g/dL


(12.0-15.5)


 


Hematocrit


 27.2 %


(36.0-47.0)


 


Mean Corpuscular Volume 67 fL () 


 


Mean Corpuscular Hemoglobin 21 pg (25-35) 


 


Mean Corpuscular Hemoglobin


Concent 31 g/dL


(31-37)


 


Red Cell Distribution Width


 22.7 %


(11.5-14.5)


 


Platelet Count


 495 x10^3/uL


(140-400)


 


Neutrophils (%) (Auto) 72 % (31-73) 


 


Lymphocytes (%) (Auto) 18 % (24-48) 


 


Monocytes (%) (Auto) 5 % (0-9) 


 


Eosinophils (%) (Auto) 5 % (0-3) 


 


Basophils (%) (Auto) 1 % (0-3) 


 


Neutrophils # (Auto)


 5.9 x10^3/uL


(1.8-7.7)


 


Lymphocytes # (Auto)


 1.5 x10^3/uL


(1.0-4.8)


 


Monocytes # (Auto)


 0.4 x10^3/uL


(0.0-1.1)


 


Eosinophils # (Auto)


 0.4 x10^3/uL


(0.0-0.7)


 


Basophils # (Auto)


 0.1 x10^3/uL


(0.0-0.2)


 


Sodium Level


 140 mmol/L


(136-145)


 


Potassium Level


 4.1 mmol/L


(3.5-5.1)


 


Chloride Level


 102 mmol/L


()


 


Carbon Dioxide Level


 30 mmol/L


(21-32)


 


Anion Gap 8 (6-14) 


 


Blood Urea Nitrogen


 10 mg/dL


(7-20)


 


Creatinine


 0.9 mg/dL


(0.6-1.0)


 


Estimated GFR


(Cockcroft-Gault) 61.9 





 


Glucose Level


 107 mg/dL


(70-99)


 


Calcium Level


 9.2 mg/dL


(8.5-10.1)











Microbiology


Micro





Microbiology


8/13/20 Urine Culture - Final, Complete


          


8/12/20 Blood Culture - Final, Complete


          NO GROWTH AFTER 5 DAYS





Physical Exam


HEENT:  Neck Supple W Full Motion


Chest:  Symmetric


LUNGS:  Other (dimnished)


Heart:  RRR (SR)


Abdomen:  Soft N/T


Extremities:  No Edema


Neurology:  alert, oriented, follow commands





Assessment


Assessment


1. Chest pain: due to PE, AFIB and RV ischemia


2. Saddle PE with LLE DVT: saddle resolved with tPA but still has scattered 

emboli


3. AFIB RVR: induced by PE. maintaining SR post CVN


4. NSTEMI:multifactorial mainly associated with PE, RV ischemia. peaked trop at 

31


5. Lumbar radiculopathy with significant decreased mobility with recent 

nontraumatic fall


6. Hypothyroidism: TSH 5 not on goal. on home replacement defer to PCP


7. Microcytic anemia: Fe def. Hgb at 8.5


8. CAD: Small amount of thrombus with 80% luminal obstruction in distal segment 

of posterior descending branch of right coronary artery which likely from 

paradoxical emboli from ASD.  No lesions needing intervention were noted.


9. ASD: moderate secundum type atrial septal defect with bidirectional shunt. 

Will monitor outpt and consider for closure once PE





Recommendations


1. Continue eliquis. ECASA 81 mg daily, monitor H/H, PLT


2. Continue amiodarone and metoprolol. 


3. Supportive care


4. Lipids is well controlled. No statin at this time. No CAD per se but rather 

coronary embolization from the PE associated with ASD.





Justicifation of Admission Dx:


Justifications for Admission:


Justification of Admission Dx:  Yes





MESSI LARKIN MD 8/19/20 2031:


CARDIO Progress Notes


Assessment


Assessment


Patient seen and examined.  Agree with NP's assessment and plan.


s/p SAMANTHA guided cardioversion yesterday, maintaining SR


Continue amiodarone and eliquis


ASD noted on tele with bidirectional shunting


RCA thrombus probably paradoxical embolization considering massive PE and 

significant ASD


No need for optimal CAD management with statins, BB etc since no there is no 

definite coronary artery stenosis and NSTEMI is from paradoxical embolization as

stated above


We will consider outpatient evaluation for percutaneous ASD closure


Follow up with our office as scheduled











MAGNUS MANZANARES          Aug 19, 2020 14:58


MESSI LARKIN MD           Aug 19, 2020 20:31

## 2020-08-19 NOTE — NUR
SS following up with discharge planning. PT/OT recommended skilled nursing unit. SS met with 
pt and pt's family in room. Pt and pt's family agreeable to skilled nursing unit  and 
requested referrals be sent to 1) [facility], [phone]; fax [phone], 
and 2) [facility] [place], [phone]; fax [phone]. SS phoned and faxed 
referrals as requested. COVID19 negative. SS will await acceptance decision and will proceed 
accordingly.

## 2020-08-19 NOTE — CARD
MR#: [id number]

Account#: [id number]

Accession#: [id number]

Date of Study: 08/18/2020

Ordering Physician: MAGNUS MANZANARES, 

Referring Physician: MAGNUS MANZANARES, 

Tech: Rowena Nielson





--------------- APPROVED REPORT --------------





EXAM: Transesophageal echocardiogram with color flow Doppler and Synchronized Cardioversion.



INDICATION

Atrial Fibrillation



Mitral Valve

MV E Mean Gr.2mmHg



Tricuspid Valve

TR P. [id number]/sTR Peak Gr.46mmHg



Reason For Test : Rule out Intracardiac Thrombus.



PROCEDURE

After obtaining informed consent, patient underwent transesophageal echo in the PACU.

Type of Sedation : General Anesthesia

Sedation was administered by Breanna Lu. 

Sedation was achieved with Propofol 130mg intravenously. 

Transesophageal probe was inserted and advanced into esophagus by Aníbal Partida MD.

The SAMANTHA was performed without complications. 

Synchronized Cardioversion attempted: Successful 

Rhythm following Synchronized Cardioversion: Normal Sinus Rhythm 

Throughout the procedure, the blood pressure, pulse oximetry, cardiac rhythm, and rate were monitored
.

The patient tolerated the procedure without adverse effects. Recovery from general anesthesia was une
ventful and vital signs were stable.



 LEFT VENTRICLE 

The left ventricle is normal size. There is normal left ventricular wall thickness. The left ventricu
lar systolic function is normal. The ejection fraction is estimated at 50-55%. There is normal LV seg
mental wall motion.



 RIGHT VENTRICLE 

The right ventricle is moderately dilated. There is normal right ventricular wall thickness.



 ATRIA 

The left atrium is mildly dilated. The right atrium is mildly dilated. There is a moderate secundum t
ype atrial septal defect with bidirectional shunt. There is no thrombus noted in the left atrial appe
ndage.



 AORTIC VALVE 

The aortic valve is normal in structure and function. Doppler and Color Flow revealed no significant 
aortic regurgitation. There is no significant aortic valvular stenosis.



 MITRAL VALVE 

The mitral valve is normal in structure and function. There is no evidence of mitral valve prolapse. 
There is no mitral valve stenosis with an mean gradient of 2 mmHg. Doppler and Color-flow revealed tr
ace mitral regurgitation.



 TRICUSPID VALVE 

The tricuspid valve is normal in structure and function. Doppler and Color Flow revealed moderate tri
cuspid regurgitation. There is no tricuspid valve stenosis.



 PULMONIC VALVE 

The pulmonic valve is not well visualized. Doppler and Color Flow revealed trace pulmonic valvular re
gurgitation. There is no pulmonic valvular stenosis.



 GREAT VESSELS 

The aortic root is normal in size.



 PERICARDIAL EFFUSION 

There is no evidence of significant pericardial effusion.



Critical Notification

Critical Value: No



<Conclusion>

The left ventricular systolic function is normal.

The ejection fraction is estimated at 50-55%.

There is normal LV segmental wall motion.

The right ventricle is moderately dilated.

*There is a moderate secundum type atrial septal defect with bidirectional shunt.

Doppler and Color-flow revealed trace mitral regurgitation.

Doppler and Color Flow revealed moderate tricuspid regurgitation.

There is no evidence of significant pericardial effusion.

No intracardiac vegetation or thrombus.

Patient underwent successful external cardioversion, reported separately.



Signed by : Aníbal Partida, 

Electronically Approved : 08/19/2020 08:08:18

## 2020-08-20 VITALS — SYSTOLIC BLOOD PRESSURE: 132 MMHG | DIASTOLIC BLOOD PRESSURE: 70 MMHG

## 2020-08-20 VITALS — SYSTOLIC BLOOD PRESSURE: 140 MMHG | DIASTOLIC BLOOD PRESSURE: 80 MMHG

## 2020-08-20 VITALS
DIASTOLIC BLOOD PRESSURE: 64 MMHG | SYSTOLIC BLOOD PRESSURE: 133 MMHG | DIASTOLIC BLOOD PRESSURE: 64 MMHG | SYSTOLIC BLOOD PRESSURE: 133 MMHG

## 2020-08-20 VITALS — SYSTOLIC BLOOD PRESSURE: 165 MMHG | DIASTOLIC BLOOD PRESSURE: 80 MMHG

## 2020-08-20 LAB
ANION GAP SERPL CALC-SCNC: 5 MMOL/L (ref 6–14)
BASOPHILS # BLD AUTO: 0.1 X10^3/UL (ref 0–0.2)
BASOPHILS NFR BLD: 1 % (ref 0–3)
BUN SERPL-MCNC: 12 MG/DL (ref 7–20)
CALCIUM SERPL-MCNC: 9.2 MG/DL (ref 8.5–10.1)
CHLORIDE SERPL-SCNC: 102 MMOL/L (ref 98–107)
CO2 SERPL-SCNC: 33 MMOL/L (ref 21–32)
CREAT SERPL-MCNC: 0.8 MG/DL (ref 0.6–1)
EOSINOPHIL NFR BLD: 0.4 X10^3/UL (ref 0–0.7)
EOSINOPHIL NFR BLD: 5 % (ref 0–3)
ERYTHROCYTE [DISTWIDTH] IN BLOOD BY AUTOMATED COUNT: 22.2 % (ref 11.5–14.5)
GFR SERPLBLD BASED ON 1.73 SQ M-ARVRAT: 70.9 ML/MIN
GLUCOSE SERPL-MCNC: 108 MG/DL (ref 70–99)
HCT VFR BLD CALC: 28.3 % (ref 36–47)
HGB BLD-MCNC: 8.9 G/DL (ref 12–15.5)
LYMPHOCYTES # BLD: 1.6 X10^3/UL (ref 1–4.8)
LYMPHOCYTES NFR BLD AUTO: 23 % (ref 24–48)
MCH RBC QN AUTO: 21 PG (ref 25–35)
MCHC RBC AUTO-ENTMCNC: 31 G/DL (ref 31–37)
MCV RBC AUTO: 67 FL (ref 79–100)
MONO #: 0.5 X10^3/UL (ref 0–1.1)
MONOCYTES NFR BLD: 6 % (ref 0–9)
NEUT #: 4.6 X10^3/UL (ref 1.8–7.7)
NEUTROPHILS NFR BLD AUTO: 64 % (ref 31–73)
PLATELET # BLD AUTO: 529 X10^3/UL (ref 140–400)
POTASSIUM SERPL-SCNC: 4.1 MMOL/L (ref 3.5–5.1)
RBC # BLD AUTO: 4.22 X10^6/UL (ref 3.5–5.4)
SODIUM SERPL-SCNC: 140 MMOL/L (ref 136–145)
WBC # BLD AUTO: 7.2 X10^3/UL (ref 4–11)

## 2020-08-20 RX ADMIN — Medication SCH CAP: at 10:07

## 2020-08-20 RX ADMIN — APIXABAN SCH MG: 5 TABLET, FILM COATED ORAL at 10:07

## 2020-08-20 RX ADMIN — AMIODARONE HYDROCHLORIDE SCH MG: 200 TABLET ORAL at 10:08

## 2020-08-20 RX ADMIN — IPRATROPIUM BROMIDE AND ALBUTEROL SCH PUFF: 20; 100 SPRAY, METERED RESPIRATORY (INHALATION) at 12:00

## 2020-08-20 RX ADMIN — FAMOTIDINE SCH MG: 20 TABLET ORAL at 10:07

## 2020-08-20 RX ADMIN — METOPROLOL TARTRATE SCH MG: 50 TABLET, FILM COATED ORAL at 10:07

## 2020-08-20 RX ADMIN — POLYETHYLENE GLYCOL 3350 SCH GM: 17 POWDER, FOR SOLUTION ORAL at 09:00

## 2020-08-20 RX ADMIN — Medication SCH EA: at 09:00

## 2020-08-20 RX ADMIN — LEVOTHYROXINE SODIUM SCH MCG: 125 TABLET ORAL at 06:26

## 2020-08-20 RX ADMIN — IPRATROPIUM BROMIDE AND ALBUTEROL SCH PUFF: 20; 100 SPRAY, METERED RESPIRATORY (INHALATION) at 09:55

## 2020-08-20 RX ADMIN — BUDESONIDE SCH MG: 0.5 INHALANT RESPIRATORY (INHALATION) at 07:37

## 2020-08-20 NOTE — NUR
Nurse exchange report called to DERECK Pickett at [facility] [phone]. History and 
discharge orders reviewed. Prescriptions and doctors orders in packet and sent with [facility] transport team.

## 2020-08-20 NOTE — PDOC
MAGNUS MANZANARES APRN 8/20/20 1308:


CARDIO Progress Notes


Date and Time


Date of Service


8/20/2020


Time of Evaluation


0930





Subjective


Subjective:  No Chest Pain, No shortness of breath, No Palpitations





Vitals


Vitals





Vital Signs








  Date Time  Temp Pulse Resp B/P (MAP) Pulse Ox O2 Delivery O2 Flow Rate FiO2


 


8/20/20 11:00 98.3 84 20 132/70 (90) 100 Nasal Cannula 2.0 





 98.3       








Weight


Weight [ ]





Input and Output


Intake and Output











Intake and Output 


 


 8/20/20





 07:00


 


Intake Total 840 ml


 


Output Total 400 ml


 


Balance 440 ml


 


 


 


Intake Oral 840 ml


 


Output Urine Total 400 ml


 


# Voids 2











Laboratory


Labs





Laboratory Tests








Test


 8/20/20


05:56


 


White Blood Count


 7.2 x10^3/uL


(4.0-11.0)


 


Red Blood Count


 4.22 x10^6/uL


(3.50-5.40)


 


Hemoglobin


 8.9 g/dL


(12.0-15.5)


 


Hematocrit


 28.3 %


(36.0-47.0)


 


Mean Corpuscular Volume 67 fL () 


 


Mean Corpuscular Hemoglobin 21 pg (25-35) 


 


Mean Corpuscular Hemoglobin


Concent 31 g/dL


(31-37)


 


Red Cell Distribution Width


 22.2 %


(11.5-14.5)


 


Platelet Count


 529 x10^3/uL


(140-400)


 


Neutrophils (%) (Auto) 64 % (31-73) 


 


Lymphocytes (%) (Auto) 23 % (24-48) 


 


Monocytes (%) (Auto) 6 % (0-9) 


 


Eosinophils (%) (Auto) 5 % (0-3) 


 


Basophils (%) (Auto) 1 % (0-3) 


 


Neutrophils # (Auto)


 4.6 x10^3/uL


(1.8-7.7)


 


Lymphocytes # (Auto)


 1.6 x10^3/uL


(1.0-4.8)


 


Monocytes # (Auto)


 0.5 x10^3/uL


(0.0-1.1)


 


Eosinophils # (Auto)


 0.4 x10^3/uL


(0.0-0.7)


 


Basophils # (Auto)


 0.1 x10^3/uL


(0.0-0.2)


 


Sodium Level


 140 mmol/L


(136-145)


 


Potassium Level


 4.1 mmol/L


(3.5-5.1)


 


Chloride Level


 102 mmol/L


()


 


Carbon Dioxide Level


 33 mmol/L


(21-32)


 


Anion Gap 5 (6-14) 


 


Blood Urea Nitrogen


 12 mg/dL


(7-20)


 


Creatinine


 0.8 mg/dL


(0.6-1.0)


 


Estimated GFR


(Cockcroft-Gault) 70.9 





 


Glucose Level


 108 mg/dL


(70-99)


 


Calcium Level


 9.2 mg/dL


(8.5-10.1)











Microbiology


Micro





Microbiology


8/13/20 Urine Culture - Final, Complete


          


8/12/20 Blood Culture - Final, Complete


          NO GROWTH AFTER 5 DAYS





Physical Exam


HEENT:  Neck Supple W Full Motion


Chest:  Symmetric


LUNGS:  Other (dimnished)


Heart:  RRR (SR)


Abdomen:  Soft N/T


Extremities:  No Edema


Neurology:  alert, oriented, follow commands





Assessment


Assessment


1. Chest pain: due to PE, AFIB and RV ischemia


2. Saddle PE with LLE DVT: saddle resolved with tPA but still has scattered 

emboli


3. AFIB RVR: induced by PE. maintaining SR post CVN


4. NSTEMI:multifactorial mainly associated with PE, RV ischemia. peaked trop at 

31


5. Lumbar radiculopathy with significant decreased mobility with recent 

nontraumatic fall


6. Hypothyroidism: TSH 5 not on goal. on home replacement defer to PCP


7. Microcytic anemia: Fe def. Hgb at 8.9


8. CAD: Small amount of thrombus with 80% luminal obstruction in distal segment 

of posterior descending branch of right coronary artery which likely from 

paradoxical emboli from ASD.  No lesions needing intervention were noted.


9. ASD: moderate secundum type atrial septal defect with bidirectional shunt. 

Will monitor as an outpt and consider for closure once PE is resolved





Recommendations


1. Continue eliquis. ECASA 81 mg daily, monitor H/H, PLT


2. Continue amiodarone and metoprolol. 


3. Supportive care


4. Lipids is well controlled. No statin at this time. No CAD per se but rather 

coronary embolization from the PE associated with ASD. 


5. Follow up in office





Justicifation of Admission Dx:


Justifications for Admission:


Justification of Admission Dx:  Yes





MESSI LARKIN MD 8/20/20 1531:


CARDIO Progress Notes


Assessment


Assessment


Patient seen and examined.  Agree with NP's assessment and plan.


s/p SAMANTHA guided cardioversion, maintaining sinus rhythm.


Continue amiodarone for antiarrhythmic therapy.


Continue Eliquis for stroke prophylaxis and for PE.


We will consider percutaneous ASD closure as an outpatient since she had 

paradoxical embolization event/RCA thrombus resulting in non-STEMI.











MAGNUS MANZANARES          Aug 20, 2020 13:08


MESSI LARKIN MD           Aug 20, 2020 15:31

## 2020-08-20 NOTE — SNU/HH DC
DISCHARGE ORDERS


DISCHARGE INFORMATION:


DISCHARGE DATE:  Aug 20, 2020


FINAL DIAGNOSIS


Problems


Medical Problems:


(1) Atrial fibrillation with RVR


Status: Acute  





(2) Chest pain


Status: Acute  





(3) Saddle embolism of pulmonary artery


Status: Acute  





(4) Suspected COVID-19 virus infection


Status: Acute  








CONDITION ON DISCHARGE:  Stable





CODE STATUS:


Code Status:  Full





SKILLED NURSING:


SNF STAY <30 DAYS:  Yes





POST DISCHARGE ORDERS:


ACTIVITY ORDERS:  No restrictions


WEIGHT BEARING STATUS:  No restrictions


DIET AFTER DISCHARGE:  Cardiac





TREATMENT/EQUIPMENT ORDERS:


Physical Therapy For:  Evalulation/Treatment


Occupational Therapy For:  Evaluation/Treatment


Speech Language Pathology For:  Evaluation/Treatment





DISCHARGE MEDICATIONS:


Home Meds


Active Scripts


Lactobacillus Rhamnosus Gg (CULTURELLE) 1 Each Cap.sprink, 1 CAP PO BID for 

probiotic for 30 Days, #60 CAP


   Prov:CALI LOOMIS MD         8/20/20


Nitroglycerin (NITROSTAT) 0.4 Mg Tab.subl, 0.4 MG SL PRN Q5MIN PRN for CHEST 

PAIN for 30 Days, #25 TAB


   Prov:CALI LOOMIS MD         8/20/20


Metoprolol Tartrate (METOPROLOL TARTRATE) 50 Mg Tablet, 50 MG PO BID for rate 

control for 30 Days, #60 TAB


   Prov:CALI LOOMIS MD         8/20/20


Amiodarone Hcl (PACERONE) 200 Mg Tablet, 200 MG PO DAILY for a fib for 30 Days, 

#30 TAB


   Prov:CALI LOOMIS MD         8/20/20


Apixaban (ELIQUIS) 5 Mg Tablet, 10 MG PO BID for PE for 7 Days, #28 TAB


   Prov:CALI LOOMIS MD         8/20/20


Apixaban (ELIQUIS) 5 Mg Tablet, 5 MG PO BID for PE for 30 Days, #60 TAB


   Prov:CALI LOOMIS MD         8/20/20


Ipratropium/Albuterol Sulfate (COMBIVENT RESPIMAT INHAL) 4 Gm Aer.w.adap, 1 PUFF

INH RTQID for dyspnea for 30 Days, #1 INH


   Prov:CALI LOOMIS MD         8/20/20


Reported Medications


Cetirizine Hcl (CETIRIZINE HCL) 10 Mg Tablet, 1 TAB PO DAILY for ALLERGIES, #30 

TAB 5 Refills


   8/13/20


Oxybutynin Chloride (DITROPAN XL) 10 Mg Tab.er.24, 10 MG PO DAILY, TAB.SR


   9/14/16


Sertraline Hcl (ZOLOFT) 100 Mg Tablet, 100 MG PO DAILY for ANTI-DEPRESSANT, TAB 

0 Refills


   9/14/16


Albuterol Sulfate (VENTOLIN HFA INHALER) 18 Gm Hfa.aer.ad, 2 PUFF INH QID for 

FOR ASTHMA, INHALER 0 Refills


   9/14/16


Fluticasone/Salmeterol (ADVAIR 100-50 DISKUS) 1 Each Disk.w.dev, 1 INH IH BID, 

INHALER


   9/14/16


Alprazolam (XANAX) 0.25 Mg Tablet, 0.25 MG PO PRN Q6HRS PRN for ANXIETY / 

AGITATION, TAB 0 Refills


   9/14/16


Cyclobenzaprine Hcl (CYCLOBENZAPRINE HCL) 10 Mg Tablet, 10 MG PO TID, TAB


   9/14/16


Levothyroxine Sodium (LEVOTHYROXINE SODIUM) 125 Mcg Tablet, 125 MCG PO DAILYAC 

for THYROID SUPPLEMENT, #30 TAB 0 Refills


   9/14/16


Ranitidine Hcl (RANITIDINE HCL) 300 Mg Capsule, 300 MG PO DAILY, CAP


   9/14/16


Tramadol Hcl (TRAMADOL HCL) 50 Mg Tablet, 50 MG PO DAILY PRN for PAIN, TAB 0 

Refills


   9/14/16


Nebivolol Hcl (BYSTOLIC) 5 Mg Tablet, 5 MG PO DAILY, TAB


   9/14/16











CALI LOOMIS MD             Aug 20, 2020 15:32

## 2020-08-20 NOTE — NUR
SS following up with discharge planning. Pt accepted at [facility], 
[phone]; fax [phone]. Bed available today. Physician notified. SS will await 
discharge orders and will proceed accordingly.

## 2020-08-20 NOTE — PDOC
PULMONARY PROGRESS NOTES


DATE: 8/20/20 


TIME: 10:04


Subjective


Feeling much better, NO SOB, or Cough 


Pt. is S/P systemic TPA on 8/13/2020, now on N/C oxygen, 2 lpm


Vitals





Vital Signs








  Date Time  Temp Pulse Resp B/P (MAP) Pulse Ox O2 Delivery O2 Flow Rate FiO2


 


8/20/20 07:38     83 Room Air  


 


8/20/20 03:00 98.1 74 18 140/80 (100)   2.0 





 98.1       








ROS:  No Nausea, No Chest Pain, No Abdominal Pain, No Increase Cough


General:  Alert, No acute distress


Lungs:  Clear


Cardiovascular:  S1, S2


Abdomen:  Soft, Non-tender


Neuro Exam:  Alert


Extremities:  No Edema


Skin:  Warm


Labs





Laboratory Tests








Test


 8/19/20


04:35 8/20/20


05:56


 


White Blood Count


 8.3 x10^3/uL


(4.0-11.0) 7.2 x10^3/uL


(4.0-11.0)


 


Red Blood Count


 4.04 x10^6/uL


(3.50-5.40) 4.22 x10^6/uL


(3.50-5.40)


 


Hemoglobin


 8.5 g/dL


(12.0-15.5) 8.9 g/dL


(12.0-15.5)


 


Hematocrit


 27.2 %


(36.0-47.0) 28.3 %


(36.0-47.0)


 


Mean Corpuscular Volume 67 fL ()  67 fL () 


 


Mean Corpuscular Hemoglobin 21 pg (25-35)  21 pg (25-35) 


 


Mean Corpuscular Hemoglobin


Concent 31 g/dL


(31-37) 31 g/dL


(31-37)


 


Red Cell Distribution Width


 22.7 %


(11.5-14.5) 22.2 %


(11.5-14.5)


 


Platelet Count


 495 x10^3/uL


(140-400) 529 x10^3/uL


(140-400)


 


Neutrophils (%) (Auto) 72 % (31-73)  64 % (31-73) 


 


Lymphocytes (%) (Auto) 18 % (24-48)  23 % (24-48) 


 


Monocytes (%) (Auto) 5 % (0-9)  6 % (0-9) 


 


Eosinophils (%) (Auto) 5 % (0-3)  5 % (0-3) 


 


Basophils (%) (Auto) 1 % (0-3)  1 % (0-3) 


 


Neutrophils # (Auto)


 5.9 x10^3/uL


(1.8-7.7) 4.6 x10^3/uL


(1.8-7.7)


 


Lymphocytes # (Auto)


 1.5 x10^3/uL


(1.0-4.8) 1.6 x10^3/uL


(1.0-4.8)


 


Monocytes # (Auto)


 0.4 x10^3/uL


(0.0-1.1) 0.5 x10^3/uL


(0.0-1.1)


 


Eosinophils # (Auto)


 0.4 x10^3/uL


(0.0-0.7) 0.4 x10^3/uL


(0.0-0.7)


 


Basophils # (Auto)


 0.1 x10^3/uL


(0.0-0.2) 0.1 x10^3/uL


(0.0-0.2)


 


Sodium Level


 140 mmol/L


(136-145) 140 mmol/L


(136-145)


 


Potassium Level


 4.1 mmol/L


(3.5-5.1) 4.1 mmol/L


(3.5-5.1)


 


Chloride Level


 102 mmol/L


() 102 mmol/L


()


 


Carbon Dioxide Level


 30 mmol/L


(21-32) 33 mmol/L


(21-32)


 


Anion Gap 8 (6-14)  5 (6-14) 


 


Blood Urea Nitrogen


 10 mg/dL


(7-20) 12 mg/dL


(7-20)


 


Creatinine


 0.9 mg/dL


(0.6-1.0) 0.8 mg/dL


(0.6-1.0)


 


Estimated GFR


(Cockcroft-Gault) 61.9 


 70.9 





 


Glucose Level


 107 mg/dL


(70-99) 108 mg/dL


(70-99)


 


Calcium Level


 9.2 mg/dL


(8.5-10.1) 9.2 mg/dL


(8.5-10.1)








Laboratory Tests








Test


 8/20/20


05:56


 


White Blood Count


 7.2 x10^3/uL


(4.0-11.0)


 


Red Blood Count


 4.22 x10^6/uL


(3.50-5.40)


 


Hemoglobin


 8.9 g/dL


(12.0-15.5)


 


Hematocrit


 28.3 %


(36.0-47.0)


 


Mean Corpuscular Volume 67 fL () 


 


Mean Corpuscular Hemoglobin 21 pg (25-35) 


 


Mean Corpuscular Hemoglobin


Concent 31 g/dL


(31-37)


 


Red Cell Distribution Width


 22.2 %


(11.5-14.5)


 


Platelet Count


 529 x10^3/uL


(140-400)


 


Neutrophils (%) (Auto) 64 % (31-73) 


 


Lymphocytes (%) (Auto) 23 % (24-48) 


 


Monocytes (%) (Auto) 6 % (0-9) 


 


Eosinophils (%) (Auto) 5 % (0-3) 


 


Basophils (%) (Auto) 1 % (0-3) 


 


Neutrophils # (Auto)


 4.6 x10^3/uL


(1.8-7.7)


 


Lymphocytes # (Auto)


 1.6 x10^3/uL


(1.0-4.8)


 


Monocytes # (Auto)


 0.5 x10^3/uL


(0.0-1.1)


 


Eosinophils # (Auto)


 0.4 x10^3/uL


(0.0-0.7)


 


Basophils # (Auto)


 0.1 x10^3/uL


(0.0-0.2)


 


Sodium Level


 140 mmol/L


(136-145)


 


Potassium Level


 4.1 mmol/L


(3.5-5.1)


 


Chloride Level


 102 mmol/L


()


 


Carbon Dioxide Level


 33 mmol/L


(21-32)


 


Anion Gap 5 (6-14) 


 


Blood Urea Nitrogen


 12 mg/dL


(7-20)


 


Creatinine


 0.8 mg/dL


(0.6-1.0)


 


Estimated GFR


(Cockcroft-Gault) 70.9 





 


Glucose Level


 108 mg/dL


(70-99)


 


Calcium Level


 9.2 mg/dL


(8.5-10.1)








Medications





Active Scripts








 Medications  Dose


 Route/Sig


 Max Daily Dose Days Date Category


 


 Cetirizine Hcl 10


 Mg Tablet  1 Tab


 PO DAILY


   8/13/20 Reported


 


 Ditropan Xl


  (Oxybutynin


 Chloride) 10 Mg


 Tab.er.24  10 Mg


 PO DAILY


   9/14/16 Reported


 


 Zoloft


  (Sertraline Hcl)


 100 Mg Tablet  100 Mg


 PO DAILY


   9/14/16 Reported


 


 Ventolin Hfa


 Inhaler


  (Albuterol


 Sulfate) 18 Gm


 Hfa.aer.ad  2 Puff


 INH QID


   9/14/16 Reported


 


 Advair 100-50


 Diskus


  (Fluticasone/Salmeterol)


 1 Each Disk.w.dev  1 Inh


 IH BID


   9/14/16 Reported


 


 Xanax


  (Alprazolam) 0.25


 Mg Tablet  0.25 Mg


 PO PRN Q6HRS PRN


   9/14/16 Reported


 


 Cyclobenzaprine


 Hcl 10 Mg Tablet  10 Mg


 PO TID


   9/14/16 Reported


 


 Levothyroxine


 Sodium 125 Mcg


 Tablet  125 Mcg


 PO DAILYAC


   9/14/16 Reported


 


 Ranitidine Hcl


 300 Mg Capsule  300 Mg


 PO DAILY


   9/14/16 Reported


 


 Tramadol Hcl 50


 Mg Tablet  50 Mg


 PO DAILY PRN


   9/14/16 Reported


 


 Bystolic


  (Nebivolol) 5 Mg


 Tablet  5 Mg


 PO DAILY


   9/14/16 Reported








Comments


cta 8/15/20 reviewed


Continued presence of multiple smaller bilateral pulmonary emboli but 


resolution of the saddle embolus seen previously. New patchy areas of 


groundglass infiltrates bilaterally especially peripherally in the left 


upper lobe. These may reflect areas of atelectasis but recommend clinical 


correlation and follow-up.





BLE U/S 


IMPRESSION:


*   Deep vein thrombosis seen on the left.





ECHIO 8/13/2020 


<Conclusion>


The left ventricular systolic function is normal and the ejection fraction is 

within normal range. The Ejection Fraction is 50%.


There is normal LV segmental wall motion.


The right ventricle is moderately to severly dilated measuring 5.2 cm.


Doppler and Color Flow revealed mild to moderate tricuspid regurgitation with an

estimated PAP of 71 mmHg.








PROCEDURE NARRATIVE


After explaining the risks, benefits and alternative options, informed consent 

was obtained from patient.  Patient was brought to the cardiac Cath Lab and 

right wrist was prepped and draped in the usual fashion after confirming a 

positive modified Justin's test.  Arterial access was obtained in the right radia

l artery and a 6 [place] sheath was inserted.  6 [place] Curtis catheter was used 

to perform selective angiography of the left and right coronary arteries.  6 

[place] pigtail catheter was used to perform left ventriculography.  Patient 

tolerated the procedure well.  Hemostasis was achieved using TR band.  There 

were no immediate complications.  The following findings were noted.





FINDINGS


1.  Hemodynamics: Elevated left ventricular end-diastolic pressure of 31 mmHg 

consistent with acute diastolic heart failure.  No pullback gradient across the 

aortic valve.


2.  Left ventriculography:  Inferoapical wall hypokinesis with ejection fraction

estimated at 55%.  No significant mitral regurgitation seen.


3.  Coronary angiography:


a.  The left main coronary artery arose from the left sinus of Valsalva, gave 

rise to the left anterior descending and left circumflex arteries and did not 

show any significant stenosis.


b.  The left anterior descending artery did not show any significant stenosis.


c.  The left circumflex artery did not show any significant stenosis.


d.  The right coronary artery was a large and dominant vessel arising from the 

right sinus of Valsalva that showed small amount of thrombus resulting in 80% 

stenosis severity in distal segment of posterior descending branch.





Conclusion


1.  Small amount of thrombus with 80% luminal obstruction in distal segment of 

posterior descending branch of right coronary artery.  No lesions needing 

intervention were noted.


2.  Inferoapical wall hypokinesis with ejection fraction estimated at 55%





Recommendations


Medical Therapy





Signed by : Aníbal Partida, 


Electronically Approved : 08/17/2020 12:09:08





Impression


.


IMPRESSION:


1.  Acute hypoxic respiratory failure secondary to acute massive pulmonary 

embolism .  Risk factor is likely related to recent immobility for


the last 2 weeks due to her lumbar radiculopathy and also from the fall when she


twisted her knee.  Underlying obesity would be another risk factor.  She has no


known cancers.  Not on any form of estrogens.-- s/p systemic TPA with sig 

improvement, off BIPAP, oxygenation improved.


2.  Atrial fibrillation with rapid ventricular response, likely caused by right 

ventricular strain., Rx with cardizem. Successful SAMANTHA guided cardioversion of 

atrial fibrillation to SR-- now on po amio


3.  No significant tobacco history.


4.  No history of any hypercoagulable state.


5. LLE DVT 


6. Markedly elevated troponin (peak 31). Out of proportion to usually seen in RV

infarction. C reviewed. Likely had MI, resolved with TPA


7.  Sig ASD, contributed to Right to left thrombus (MI)





Plan


.


RECOMMENDATIONS:


Continue supplemental oxygen to keep sats above 92 %, now on N/C 2 lpm


covid19 neg


S/P systemic TPA with sig improvement of hypoxia now on On Eliquis


Repeat CTA chest, reviewed. much improved saddle emboli from central PA


Follow cardiology recommendations-- continued management of AFIB- s/p successful

cardioversion


cath reviewed.Small amount of thrombus with 80% luminal obstruction in distal 

segment of posterior descending branch of right coronary artery.  No lesions 

needing intervention were noted. MI improved with TPA


ECHO revied EF 50% 


BLE DPLX positive for LLE DVT 


NEBS


hypercoagulable panel as an outpatient.


3-6 months of AC


Right heart cath as OP to assess for R-Left shunt and need for ASD closure-- per

cardiology 


PT/OT-- plan to  D/C to rehab with oxygen 





DVT/GI PPX 





D/W RN











UMU FRAZIER MD                 Aug 20, 2020 10:07

## 2020-08-20 NOTE — DS
DATE OF DISCHARGE:  



ADMISSION DIAGNOSES:  Saddle embolism, pulmonary embolism.



DISCHARGE DIAGNOSES:  Resolving saddle embolism, resolving atrial fibrillation.



HOSPITAL COURSE:  The patient is a pleasant 70-year-old female who presented

with pulmonary embolism.  It was actually a saddle embolism.  She did receive

tPA.  A few hours after tPA, we gave her heparin drip.  Over the next few days,

she improved, but she developed some AFib.  We did have Cardiology and Pulmonary

on the case.  She was taken for cardioversion.  Post-procedure, she did well. 

We discharged her to home with close outpatient followup on p.o.

anticoagulation.



DISPOSITION:  Home.



ACTIVITY:  As tolerated.



DIET:  Low sodium.



MEDICATIONS:  She is on Eliquis.  I gave her a prescription for that.



Total time 34 minutes.



TOTAL TIME:  34 minutes.

 



______________________________

ARUN GARDINER DO



DR:  TIANNA/bulmaro  JOB#:  [id number] / [id number]

DD:  08/20/2020 11:39  DT:  08/20/2020 11:48

## 2020-08-20 NOTE — PDOC3
Discharge Summary


Visit Information


Date of Admission:  Aug 13, 2020


Date of Discharge:  Aug 20, 2020


Admitting Diagnosis Comment:


1. Chest pain: due to PE, AFIB and RV ischemia


Extensive acute pulmonary emboli to include a saddle embolism, emboli  within 

both main pulmonary arteries and involvement of the lobar and  segmental 

branches to all lung lobes. // pronounced right  heart strain. No well-formed 

pulmonary infarct at this time.


2. Moderate-sized hiatal hernia, hepatic steatosis  


3. Saddle PE ACUTE 


4. AFIB RVR: 


5. NSTEMI: likely associated RV ischemia with PE


6. Lumbar radiculopathy // recent nontraumatic fall


7. Hypothyroidism: TSH 5 


8. Microcytic anemia: Fe 


9. MORBID OBESITY


Final Diagnosis


Problems


Medical Problems:


(1) Atrial fibrillation with RVR


Status: Acute  





(2) Chest pain


Status: Acute  





(3) Saddle embolism of pulmonary artery


Status: Acute  





(4) ruled out COVID-19 virus infection


Status: Acute  





Acute hypoxic respiratory failure with worsening hypoxia secondary to saddle 

pulmonary embolism


Markedly increased troponin level secondary to severe right ventricular infarct

ion and ischemia


Atrial fibrillation with rapid ventricular response induced by PE  


moderate tricuspid regurgitation with an estimated PAP of 71 mmHg. c/w severe 

pulm hypertension 


Status post cardiac cath yesterday no new stents placed


Going for defibrillation today


Hypertension


Hyperlipidemia 


Lumbar radiculopathy with significant decreased mobility with recent 

nontraumatic fall


Hypothyroidism


Microcytic anemia





Brief Hospital Course


Allergies





                                    Allergies








Coded Allergies Type Severity Reaction Last Updated Verified


 


  Sulfa (Sulfonamide Antibiotics) Allergy Intermediate  9/14/16 Yes








Vital Signs





Vital Signs








  Date Time  Temp Pulse Resp B/P (MAP) Pulse Ox O2 Delivery O2 Flow Rate FiO2


 


8/20/20 11:00 98.3 84 20 132/70 (90) 100 Nasal Cannula 2.0 





 98.3       








Lab Results





Laboratory Tests








Test


 8/19/20


04:35 8/20/20


05:56


 


White Blood Count


 8.3 x10^3/uL


(4.0-11.0) 7.2 x10^3/uL


(4.0-11.0)


 


Red Blood Count


 4.04 x10^6/uL


(3.50-5.40) 4.22 x10^6/uL


(3.50-5.40)


 


Hemoglobin


 8.5 g/dL


(12.0-15.5) 8.9 g/dL


(12.0-15.5)


 


Hematocrit


 27.2 %


(36.0-47.0) 28.3 %


(36.0-47.0)


 


Mean Corpuscular Volume 67 fL ()  67 fL () 


 


Mean Corpuscular Hemoglobin 21 pg (25-35)  21 pg (25-35) 


 


Mean Corpuscular Hemoglobin


Concent 31 g/dL


(31-37) 31 g/dL


(31-37)


 


Red Cell Distribution Width


 22.7 %


(11.5-14.5) 22.2 %


(11.5-14.5)


 


Platelet Count


 495 x10^3/uL


(140-400) 529 x10^3/uL


(140-400)


 


Neutrophils (%) (Auto) 72 % (31-73)  64 % (31-73) 


 


Lymphocytes (%) (Auto) 18 % (24-48)  23 % (24-48) 


 


Monocytes (%) (Auto) 5 % (0-9)  6 % (0-9) 


 


Eosinophils (%) (Auto) 5 % (0-3)  5 % (0-3) 


 


Basophils (%) (Auto) 1 % (0-3)  1 % (0-3) 


 


Neutrophils # (Auto)


 5.9 x10^3/uL


(1.8-7.7) 4.6 x10^3/uL


(1.8-7.7)


 


Lymphocytes # (Auto)


 1.5 x10^3/uL


(1.0-4.8) 1.6 x10^3/uL


(1.0-4.8)


 


Monocytes # (Auto)


 0.4 x10^3/uL


(0.0-1.1) 0.5 x10^3/uL


(0.0-1.1)


 


Eosinophils # (Auto)


 0.4 x10^3/uL


(0.0-0.7) 0.4 x10^3/uL


(0.0-0.7)


 


Basophils # (Auto)


 0.1 x10^3/uL


(0.0-0.2) 0.1 x10^3/uL


(0.0-0.2)


 


Sodium Level


 140 mmol/L


(136-145) 140 mmol/L


(136-145)


 


Potassium Level


 4.1 mmol/L


(3.5-5.1) 4.1 mmol/L


(3.5-5.1)


 


Chloride Level


 102 mmol/L


() 102 mmol/L


()


 


Carbon Dioxide Level


 30 mmol/L


(21-32) 33 mmol/L


(21-32)


 


Anion Gap 8 (6-14)  5 (6-14) 


 


Blood Urea Nitrogen


 10 mg/dL


(7-20) 12 mg/dL


(7-20)


 


Creatinine


 0.9 mg/dL


(0.6-1.0) 0.8 mg/dL


(0.6-1.0)


 


Estimated GFR


(Cockcroft-Gault) 61.9 


 70.9 





 


Glucose Level


 107 mg/dL


(70-99) 108 mg/dL


(70-99)


 


Calcium Level


 9.2 mg/dL


(8.5-10.1) 9.2 mg/dL


(8.5-10.1)








Laboratory Tests








Test


 8/20/20


05:56


 


White Blood Count


 7.2 x10^3/uL


(4.0-11.0)


 


Red Blood Count


 4.22 x10^6/uL


(3.50-5.40)


 


Hemoglobin


 8.9 g/dL


(12.0-15.5)


 


Hematocrit


 28.3 %


(36.0-47.0)


 


Mean Corpuscular Volume 67 fL () 


 


Mean Corpuscular Hemoglobin 21 pg (25-35) 


 


Mean Corpuscular Hemoglobin


Concent 31 g/dL


(31-37)


 


Red Cell Distribution Width


 22.2 %


(11.5-14.5)


 


Platelet Count


 529 x10^3/uL


(140-400)


 


Neutrophils (%) (Auto) 64 % (31-73) 


 


Lymphocytes (%) (Auto) 23 % (24-48) 


 


Monocytes (%) (Auto) 6 % (0-9) 


 


Eosinophils (%) (Auto) 5 % (0-3) 


 


Basophils (%) (Auto) 1 % (0-3) 


 


Neutrophils # (Auto)


 4.6 x10^3/uL


(1.8-7.7)


 


Lymphocytes # (Auto)


 1.6 x10^3/uL


(1.0-4.8)


 


Monocytes # (Auto)


 0.5 x10^3/uL


(0.0-1.1)


 


Eosinophils # (Auto)


 0.4 x10^3/uL


(0.0-0.7)


 


Basophils # (Auto)


 0.1 x10^3/uL


(0.0-0.2)


 


Sodium Level


 140 mmol/L


(136-145)


 


Potassium Level


 4.1 mmol/L


(3.5-5.1)


 


Chloride Level


 102 mmol/L


()


 


Carbon Dioxide Level


 33 mmol/L


(21-32)


 


Anion Gap 5 (6-14) 


 


Blood Urea Nitrogen


 12 mg/dL


(7-20)


 


Creatinine


 0.8 mg/dL


(0.6-1.0)


 


Estimated GFR


(Cockcroft-Gault) 70.9 





 


Glucose Level


 108 mg/dL


(70-99)


 


Calcium Level


 9.2 mg/dL


(8.5-10.1)








Brief Hospital Course





History and Physical


Date of Admission


Date of Admission


DATE: 8/12/20 


TIME: 13:21





Identification/Chief Complaint


Chief Complaint


  SEEN IN ER WITH ACUTE PE , 70  year old female who was brought here from home 

due to chest pain substernal started several hours ago.  Patient called EMS, 

they  gave her 324 mg aspirin and 1 dose of nitroglycerin.  Patient says she has

been sick with a cough for about 12 days.  Patient denies any fever.  Patient 

denies any history of heart problem. 





 not sure if she been exposed to anybody who had  coronavirus.  CTA POS PE





8/20/2020


Patient did not complain of any discomfort at the time of discharge from her 

chest discomfort had resolved no palpitations no shortness of breath she did 

feel kind of weak and I have provided reassurance that this is been a 

consequence of her prolonged hospital stay.  She was also reassured that given 

that her thrombus burden has decreased with the administration of TPA during her

hospital stay her symptoms will continue to progress favorably especially once 

she starts her rehab facility program.  Patient was seen in consultation by 

cardiology with the following recommendations upon dischargeRecommendations


1. Continue eliquis. ECASA 81 mg daily, monitor H/H, PLT


2. Continue amiodarone and metoprolol. 


3. Supportive care


4. Lipids is well controlled. No statin at this time. No CAD per se but rather 

coronary embolization from the PE associated with ASD. 


5. Follow up in office





Justicifation of Admission Dx:


Justifications for Admission:


Justification of Admission Dx:  Yes





MESSI LARKIN MD 8/20/20 1531:


CARDIO Progress Notes


Assessment


Assessment


Patient seen and examined.  Agree with NP's assessment and plan.


s/p SAMANTHA guided cardioversion, maintaining sinus rhythm.


Continue amiodarone for antiarrhythmic therapy.


Continue Eliquis for stroke prophylaxis and for PE.


We will consider percutaneous ASD closure as an outpatient since she had 

paradoxical embolization event/RCA thrombus resulting in non-STEMI.








8/19/2020


No acute events reported overnight, case discussed with nursing staff patient in

no acute distress no complaints during my visit





8/18/2020


Patient seen and examined


She is going for defibrillation this morning


Chart reviewed


Discussed with RN


Discussed with case management


Cardiac cath yesterday without stents











8/17/2020


Patient seen and examined


Resting with NAD


Awaiting Cardiac Cath Lab to r/o Coronary Artery Disease


If negative cath, hope to change anticoagulant to PO


Discussed with RN


Reviewed chart.











8/15/2020


Patient seen and examined


She just completed another repeat angiography of her chest


I reviewed the films the saddle emboli seems to have resolved quite a bit by my 

eye (awaiting official report from radiologist)


Discussed with RN


Chart reviewed











08/14/2020


Patient seen and examined in ICU 


Patient is off BiPAP and down to 5L O2 per NC  


She received tPA yesterday, now on heparin drip 


COVID negative 


Discussed with RN 


Chart reviewed 








08/13/20


Patient seen and examined in the ICU 


Hypoxia is worsening and she is requiring 100% oxygen on BiPAP


Troponin increased to 31, she has begun tPA per Dr. Patel 


Echo this AM showed normal LV wall motion, EF 50%, RV dilation secondary to 

tricuspid regurgitation 


Awaiting COVID results


Discussed with RN 


Chart reviewed








.Physical Exam


HEENT:  Neck Supple W Full Motion


Chest:  Symmetric


LUNGS:  Other (dimnished)


Heart:  RRR (SR)


Abdomen:  Soft N/T


Extremities:  No Edema


Neurology:  alert, oriented, follow commands





Greater than 35 minutes were spent in the discharge process the patient in 

counseling coordination of care and arrangements for a safe transfer





Discharge Information


Condition at Discharge:  Improved


Follow Up:  Weeks


Disposition/Orders:  D/C to Another Facility


Scheduled


Albuterol Sulfate (Ventolin Hfa Inhaler) 18 Gm Hfa.aer.ad, 2 PUFF INH QID for 

FOR ASTHMA, Ref 0 (Reported)


   Entered as Reported by: TOBIN PAK on 9/14/16 0932


Amiodarone Hcl (Pacerone) 200 Mg Tablet, 200 MG PO DAILY for a fib for 30 Days, 

#30


   Prescribed by: CALI LOOMIS MD on 8/20/20 1531


Apixaban (Eliquis) 5 Mg Tablet, 5 MG PO BID for PE for 30 Days, #60


   Prescribed by: CALI LOOMIS MD on 8/20/20 1531


Apixaban (Eliquis) 5 Mg Tablet, 10 MG PO BID for PE for 7 Days, #28


   Prescribed by: CALI LOOMIS MD on 8/20/20 1531


Cetirizine Hcl (Cetirizine Hcl) 10 Mg Tablet, 1 TAB PO DAILY for ALLERGIES, #30 

Ref 5 (Reported)


   Entered as Reported by: OLY ZAMBRANO on 8/13/20 1518


   Last Taken: UNKNOWN on Unknown Date & Time     Last Action: New Order on 

8/13/20 1518 by OLY ZAMBRANO


Cyclobenzaprine Hcl (Cyclobenzaprine Hcl) 10 Mg Tablet, 10 MG PO TID, (Reported)


   Entered as Reported by: TOBIN PAK on 9/14/16 0932


   Last Action: Continued on 8/12/20 1844 by OLY ZAMBRANO


Fluticasone/Salmeterol (Advair 100-50 Diskus) 1 Each Disk.w.dev, 1 INH IH BID, 

(Reported)


   Entered as Reported by: TOBIN PAK on 9/14/16 0932


Ipratropium/Albuterol Sulfate (Combivent Respimat Inhal) 4 Gm Aer.w.adap, 1 PUFF

INH RTQID for dyspnea for 30 Days, #1


   Prescribed by: CALI LOOMIS MD on 8/20/20 1531


Lactobacillus Rhamnosus Gg (Culturelle) 1 Each Cap.sprink, 1 CAP PO BID for 

probiotic for 30 Days, #60


   Prescribed by: CALI LOOMIS MD on 8/20/20 1531


Levothyroxine Sodium (Levothyroxine Sodium) 125 Mcg Tablet, 125 MCG PO DAILYAC 

for THYROID SUPPLEMENT, #30 Ref 0 (Reported)


   Entered as Reported by: TOBIN PAK on 9/14/16 0932


   Last Action: Continued on 8/12/20 1844 by OLY ZAMBRANO


Metoprolol Tartrate (Metoprolol Tartrate) 50 Mg Tablet, 50 MG PO BID for rate 

control for 30 Days, #60


   Prescribed by: CALI LOOMIS MD on 8/20/20 1531


Nebivolol Hcl (Bystolic) 5 Mg Tablet, 5 MG PO DAILY, (Reported)


   Entered as Reported by: TOBIN PAK on 9/14/16 0932


Oxybutynin Chloride (Ditropan Xl) 10 Mg Tab.er.24, 10 MG PO DAILY, (Reported)


   Entered as Reported by: OTBIN PAK on 9/14/16 0932


Ranitidine Hcl (Ranitidine Hcl) 300 Mg Capsule, 300 MG PO DAILY, (Reported)


   Entered as Reported by: TOBIN PAK on 9/14/16 0932


Sertraline Hcl (Zoloft) 100 Mg Tablet, 100 MG PO DAILY for ANTI-DEPRESSANT, Ref 

0 (Reported)


   Entered as Reported by: TOBIN APK on 9/14/16 0932





Scheduled PRN


Alprazolam (Xanax) 0.25 Mg Tablet, 0.25 MG PO PRN Q6HRS PRN for ANXIETY / 

AGITATION, Ref 0 (Reported)


   Entered as Reported by: TOBIN PAK on 9/14/16 0932


   Last Action: Continued on 8/13/20 1520 by OLY ZAMBRANO


Nitroglycerin (Nitrostat) 0.4 Mg Tab.subl, 0.4 MG SL PRN Q5MIN PRN for CHEST 

PAIN for 30 Days, #25


   Prescribed by: CALI LOOMIS MD on 8/20/20 1531


Tramadol Hcl (Tramadol Hcl) 50 Mg Tablet, 50 MG PO DAILY PRN for PAIN, Ref 0 

(Reported)


   Entered as Reported by: TOBIN PAK on 9/14/16 0932


   Last Action: Continued on 8/12/20 1844 by OLY ZAMBRANO





Justicifation of Admission Dx:


Justifications for Admission:


Justification of Admission Dx:  Yes











CALI LOOMIS MD             Aug 20, 2020 15:39

## 2020-08-20 NOTE — NUR
SS following up with discharge planning. Discharge orders received for [facility] of 
[place], [phone]; fax [phone]. SS faxed discharge orders. Pt will discharge today 
and go to [facility] at 1530. [facility] to provide transportation. Pt, pt's RN, and 
pt's family notified.

## 2020-08-20 NOTE — NUR
This RN informed [place] pt's daughter of follow up appointment with Dr. Partida Sept 30, 2020 at 1:30 pm and Dr. Ribera October 7, 2020 at 9:45 AM.

## 2020-10-12 ENCOUNTER — HOSPITAL ENCOUNTER (OUTPATIENT)
Dept: HOSPITAL 61 - US | Age: 70
End: 2020-10-12
Attending: INTERNAL MEDICINE
Payer: MEDICARE

## 2020-10-12 DIAGNOSIS — Z90.49: ICD-10-CM

## 2020-10-12 DIAGNOSIS — N28.1: ICD-10-CM

## 2020-10-12 DIAGNOSIS — K44.9: ICD-10-CM

## 2020-10-12 DIAGNOSIS — I26.99: Primary | ICD-10-CM

## 2020-10-12 PROCEDURE — 93970 EXTREMITY STUDY: CPT

## 2020-10-12 PROCEDURE — 71275 CT ANGIOGRAPHY CHEST: CPT

## 2020-10-12 NOTE — RAD
Examination: CT ANGIOGRAPHY CHEST

 

History: Reason: PE, DVT / Spl. Instructions: IV OMNI 350 100 MLS / 

History: 

 

Comparison/Correlation: 8/15/2020 PE protocol CT chest

 

Findings: Axial images of chest were obtained following IV contrast 

according to pulmonary arteriography protocol. Sagittal and coronal 

reformatted images were provided. Maximum intensity projection images 

provided.

 

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 Thoracic aorta is grossly unremarkable 

although evaluation is limited at the aortic root and ascending aorta due 

to motion. Pulmonary arterial vasculature is normal with no thromboembolic

disease. Mosaic attenuation of the lung fields is present. No 

pneumothorax. No pleural or pericardial effusion. No enlarged thoracic 

lymph nodes. Bony structures are unremarkable for the patient's age. 

Cholecystectomy noted. Moderate-sized hiatal hernia is present.

 

Right renal superior pole cysts are present and do not meet follow-up.

 

 

Impression:

No pulmonary arterial thromboembolic disease identified. Previously 

evident PE has resolved.

 

Mosaic attenuation of the lung fields. This may represent small vessel or 

small airway disease. It is somewhat more evident in the interval although

previously seen infiltrates have resolved.

 

Moderate size hiatal hernia.

 

 

 

PQRS Compliance Statement:

 

One or more of the following individualized dose reduction techniques were

utilized for this examination:  

1. Automated exposure control  

2. Adjustment of the mA and/or kV according to patient size  

3. Use of iterative reconstruction technique

 

Electronically signed by: Kavin White MD (10/12/2020 4:09 PM) [facility]

## 2020-10-12 NOTE — RAD
EXAMINATION:  VENOUS LOWER EXT BILATERAL

 

HISTORY:  Leg pain, history of PE

 

COMPARISON/CORRELATION:  None

 

FINDINGS:  Bilateral lower extremity duplex venous ultrasound exam was 

performed. Grayscale, color Doppler, and spectral Doppler imaging was 

performed. Compression and augmentation was performed.

 

The right common femoral vein, superficial  femoral vein, popliteal vein, 

and saphenofemoral junction are normal with no evidence of deep venous 

thrombus. The visualized calf veins are unremarkable. Normal 

compressibility and augmentation is evident.

 

The left common femoral vein, superficial femoral vein, popliteal vein, 

and saphenofemoral junction are normal with no evidence of deep venous 

thrombus. The visualized calf veins are unremarkable although there is 

limited visualization of the left peroneal veins. Normal compressibility 

and augmentation is evident.

 

 

 

IMPRESSION:

Normal bilateral lower extremity duplex ultrasound exam.

 

No evidence of deep venous thrombus involving the lower extremities.

 

Electronically signed by: Kavin White MD (10/12/2020 3:57 PM) [facility]